# Patient Record
Sex: MALE | Race: WHITE | NOT HISPANIC OR LATINO | URBAN - METROPOLITAN AREA
[De-identification: names, ages, dates, MRNs, and addresses within clinical notes are randomized per-mention and may not be internally consistent; named-entity substitution may affect disease eponyms.]

---

## 2017-07-13 ENCOUNTER — INPATIENT (INPATIENT)
Facility: HOSPITAL | Age: 67
LOS: 4 days | Discharge: HOME | DRG: 131 | End: 2017-07-18
Attending: SURGERY | Admitting: SURGERY
Payer: MEDICARE

## 2017-07-13 VITALS
RESPIRATION RATE: 18 BRPM | TEMPERATURE: 97 F | DIASTOLIC BLOOD PRESSURE: 89 MMHG | OXYGEN SATURATION: 97 % | WEIGHT: 179.9 LBS | HEART RATE: 80 BPM | SYSTOLIC BLOOD PRESSURE: 144 MMHG
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
## 2017-07-13 DIAGNOSIS — S02.19XA OTHER FRACTURE OF BASE OF SKULL, INITIAL ENCOUNTER FOR CLOSED FRACTURE: ICD-10-CM

## 2017-07-13 DIAGNOSIS — I60.9 NONTRAUMATIC SUBARACHNOID HEMORRHAGE, UNSPECIFIED: ICD-10-CM

## 2017-07-13 DIAGNOSIS — S06.5X1A TRAUMATIC SUBDURAL HEMORRHAGE WITH LOSS OF CONSCIOUSNESS OF 30 MINUTES OR LESS, INITIAL ENCOUNTER: ICD-10-CM

## 2017-07-13 DIAGNOSIS — S02.80XA FRACTURE OF OTHER SPECIFIED SKULL AND FACIAL BONES, UNSPECIFIED SIDE, INITIAL ENCOUNTER FOR CLOSED FRACTURE: ICD-10-CM

## 2017-07-13 DIAGNOSIS — H11.31 CONJUNCTIVAL HEMORRHAGE, RIGHT EYE: ICD-10-CM

## 2017-07-13 DIAGNOSIS — S02.401A MAXILLARY FRACTURE, UNSPECIFIED SIDE, INITIAL ENCOUNTER FOR CLOSED FRACTURE: ICD-10-CM

## 2017-07-13 DIAGNOSIS — S06.6X1A TRAUMATIC SUBARACHNOID HEMORRHAGE WITH LOSS OF CONSCIOUSNESS OF 30 MINUTES OR LESS, INITIAL ENCOUNTER: ICD-10-CM

## 2017-07-13 LAB
ALBUMIN SERPL ELPH-MCNC: 4.8 G/DL — SIGNIFICANT CHANGE UP (ref 3.3–5.2)
ALP SERPL-CCNC: 63 U/L — SIGNIFICANT CHANGE UP (ref 40–120)
ALT FLD-CCNC: 25 U/L — SIGNIFICANT CHANGE UP
ANION GAP SERPL CALC-SCNC: 13 MMOL/L — SIGNIFICANT CHANGE UP (ref 5–17)
APTT BLD: 30.5 SEC — SIGNIFICANT CHANGE UP (ref 27.5–37.4)
AST SERPL-CCNC: 44 U/L — HIGH
BASOPHILS # BLD AUTO: 0 K/UL — SIGNIFICANT CHANGE UP (ref 0–0.2)
BASOPHILS NFR BLD AUTO: 0.1 % — SIGNIFICANT CHANGE UP (ref 0–2)
BILIRUB SERPL-MCNC: 0.7 MG/DL — SIGNIFICANT CHANGE UP (ref 0.4–2)
BLD GP AB SCN SERPL QL: SIGNIFICANT CHANGE UP
BUN SERPL-MCNC: 22 MG/DL — HIGH (ref 8–20)
CALCIUM SERPL-MCNC: 9.8 MG/DL — SIGNIFICANT CHANGE UP (ref 8.6–10.2)
CHLORIDE SERPL-SCNC: 101 MMOL/L — SIGNIFICANT CHANGE UP (ref 98–107)
CO2 SERPL-SCNC: 27 MMOL/L — SIGNIFICANT CHANGE UP (ref 22–29)
CREAT SERPL-MCNC: 0.82 MG/DL — SIGNIFICANT CHANGE UP (ref 0.5–1.3)
GLUCOSE SERPL-MCNC: 127 MG/DL — HIGH (ref 70–115)
HCT VFR BLD CALC: 41.3 % — LOW (ref 42–52)
HGB BLD-MCNC: 14 G/DL — SIGNIFICANT CHANGE UP (ref 14–18)
INR BLD: 1.07 RATIO — SIGNIFICANT CHANGE UP (ref 0.88–1.16)
LYMPHOCYTES # BLD AUTO: 1.1 K/UL — SIGNIFICANT CHANGE UP (ref 1–4.8)
LYMPHOCYTES # BLD AUTO: 10.7 % — LOW (ref 20–55)
MCHC RBC-ENTMCNC: 30.1 PG — SIGNIFICANT CHANGE UP (ref 27–31)
MCHC RBC-ENTMCNC: 33.9 G/DL — SIGNIFICANT CHANGE UP (ref 32–36)
MCV RBC AUTO: 88.8 FL — SIGNIFICANT CHANGE UP (ref 80–94)
MONOCYTES # BLD AUTO: 0.8 K/UL — SIGNIFICANT CHANGE UP (ref 0–0.8)
MONOCYTES NFR BLD AUTO: 7.9 % — SIGNIFICANT CHANGE UP (ref 3–10)
NEUTROPHILS # BLD AUTO: 8.5 K/UL — HIGH (ref 1.8–8)
NEUTROPHILS NFR BLD AUTO: 81.1 % — HIGH (ref 37–73)
PLATELET # BLD AUTO: 293 K/UL — SIGNIFICANT CHANGE UP (ref 150–400)
POTASSIUM SERPL-MCNC: 4.7 MMOL/L — SIGNIFICANT CHANGE UP (ref 3.5–5.3)
POTASSIUM SERPL-SCNC: 4.7 MMOL/L — SIGNIFICANT CHANGE UP (ref 3.5–5.3)
PROT SERPL-MCNC: 7.4 G/DL — SIGNIFICANT CHANGE UP (ref 6.6–8.7)
PROTHROM AB SERPL-ACNC: 11.8 SEC — SIGNIFICANT CHANGE UP (ref 9.8–12.7)
RBC # BLD: 4.65 M/UL — SIGNIFICANT CHANGE UP (ref 4.6–6.2)
RBC # FLD: 12.3 % — SIGNIFICANT CHANGE UP (ref 11–15.6)
SODIUM SERPL-SCNC: 141 MMOL/L — SIGNIFICANT CHANGE UP (ref 135–145)
TYPE + AB SCN PNL BLD: SIGNIFICANT CHANGE UP
WBC # BLD: 10.5 K/UL — SIGNIFICANT CHANGE UP (ref 4.8–10.8)
WBC # FLD AUTO: 10.5 K/UL — SIGNIFICANT CHANGE UP (ref 4.8–10.8)

## 2017-07-13 PROCEDURE — 71010: CPT | Mod: 26

## 2017-07-13 PROCEDURE — 99232 SBSQ HOSP IP/OBS MODERATE 35: CPT

## 2017-07-13 PROCEDURE — 93010 ELECTROCARDIOGRAM REPORT: CPT

## 2017-07-13 PROCEDURE — 70450 CT HEAD/BRAIN W/O DYE: CPT | Mod: 26

## 2017-07-13 PROCEDURE — 99291 CRITICAL CARE FIRST HOUR: CPT

## 2017-07-13 PROCEDURE — 72125 CT NECK SPINE W/O DYE: CPT | Mod: 26

## 2017-07-13 PROCEDURE — 70450 CT HEAD/BRAIN W/O DYE: CPT | Mod: 26,77

## 2017-07-13 PROCEDURE — 70486 CT MAXILLOFACIAL W/O DYE: CPT | Mod: 26

## 2017-07-13 RX ORDER — CEFAZOLIN SODIUM 1 G
1000 VIAL (EA) INJECTION ONCE
Qty: 0 | Refills: 0 | Status: COMPLETED | OUTPATIENT
Start: 2017-07-13 | End: 2017-07-13

## 2017-07-13 RX ORDER — ACETAMINOPHEN 500 MG
650 TABLET ORAL EVERY 6 HOURS
Qty: 0 | Refills: 0 | Status: DISCONTINUED | OUTPATIENT
Start: 2017-07-13 | End: 2017-07-17

## 2017-07-13 RX ORDER — ACETAMINOPHEN 500 MG
975 TABLET ORAL ONCE
Qty: 0 | Refills: 0 | Status: COMPLETED | OUTPATIENT
Start: 2017-07-13 | End: 2017-07-13

## 2017-07-13 RX ORDER — SODIUM CHLORIDE 9 MG/ML
1000 INJECTION INTRAMUSCULAR; INTRAVENOUS; SUBCUTANEOUS
Qty: 0 | Refills: 0 | Status: DISCONTINUED | OUTPATIENT
Start: 2017-07-13 | End: 2017-07-14

## 2017-07-13 RX ADMIN — Medication 100 MILLIGRAM(S): at 15:29

## 2017-07-13 RX ADMIN — Medication 650 MILLIGRAM(S): at 23:41

## 2017-07-13 RX ADMIN — Medication 975 MILLIGRAM(S): at 16:00

## 2017-07-13 RX ADMIN — Medication 650 MILLIGRAM(S): at 22:13

## 2017-07-13 RX ADMIN — Medication 975 MILLIGRAM(S): at 14:21

## 2017-07-13 NOTE — ED ADULT NURSE REASSESSMENT NOTE - NS ED NURSE REASSESS COMMENT FT1
pt taken to SICU at this time on cardiac monitor with RN and transporter, pt in no distress, remains AOX3 with family at bedside. even and unlabored resps present, skin warm dry and intact. no change in mental status since arrival to ED, ALLAN with strength and purpose.

## 2017-07-13 NOTE — ED ADULT NURSE NOTE - CAS EDN DISCHARGE ASSESSMENT
Alert and oriented to person, place and time Alert and oriented to person, place and time/Patient baseline mental status/Awake

## 2017-07-13 NOTE — ED STATDOCS - PROGRESS NOTE DETAILS
CT tech advised of urgent need for CT to be completed. I brought to the PT to CT and advised tech he is ready for his scan. Will follow up CT scan. HPI confirmed. PE: HEENT: + right periorbital swelling and eccymosis, + conjunctival hemmorhage to lateral aspect of right eye, + 1.5cm right eyebrow laceration,  no raccoon eyes, no baker sings, no hemotympanum, PERRL, EOMI, no nystagmus, no dental injuries Neck: supple, no midline tenderness to palpation, + FROM, NEXUS negative, no abrasions, no echymosis Chest: non tender, equal expansion bilaterally, no echymosis, no abrasions. Lungs: CTA, good air entry bilaterally, no wheezing, no rales, no rhonchi Abdomen: soft, non tender, no guarding, no rebound, no distention, no ecchymosis Back: no midline tenderness to palpation Extremities: atraumatic, + FROM Skin: no rash Neuro: A & O x 3, clear speech, no focal deficits trauma team and neurosurgical PA at bedside.

## 2017-07-13 NOTE — ED STATDOCS - MEDICAL DECISION MAKING DETAILS
MD note needed gcs 15 fall 8 feeet no neuro deficits with acute SAH SDH trauma consulted multiple bedside reassessments neuro status .

## 2017-07-13 NOTE — CONSULT NOTE ADULT - ASSESSMENT
A/P: 66 y/o male s/p fall found with small acute traumatic SAH high right frontal region, and small acute traumatic SDH in the anterior interhemispheric region. Patient seen in ED and offers no focal neurologic deficits.  - ACS admission  - Repeat CT Head 6 hours from original (~19:45)  - Keppra 500 mg BID for seizure ppx for a total of 7 days (D/C 7/20/17)  - Consider plastics consult given orbital involvement A/P: 68 y/o male s/p fall found with small acute traumatic SAH high right frontal region, and small acute traumatic SDH in the anterior interhemispheric region. Patient seen in ED and offers no focal neurologic deficits.  - Discussed with Dr. Christensen  - ACS admission  - Repeat CT Head 6 hours from original (~19:45)  - Keppra 500 mg BID for seizure ppx for a total of 7 days (D/C 7/20/17)  - Consider plastics consult given orbital involvement A/P: 66 y/o male s/p fall found with small acute HH1 MF1 traumatic SAH high right frontal region, small acute traumatic SDH in the anterior interhemispheric region, and multiple maxillofacial fractures. Patient seen in ED and offers no focal neurologic deficits.  - Discussed with Dr. Christensen  - ACS admission  - Repeat CT Head 6 hours from original (~19:45)  - Keppra 500 mg BID for seizure ppx for a total of 7 days (D/C 7/20/17)  - Consider plastics consult given orbital involvement A/P: 68 y/o male s/p fall found with small acute HH1 MF1 traumatic SAH high right frontal region, small acute traumatic SDH in the anterior interhemispheric region, and multiple maxillofacial fractures. Patient seen in ED and offers no focal neurologic deficits.  - Discussed with Dr. Christensen  - ACS admission  - Neuro checks Q1 hours, HOB 30 degrees, normotensive goals/SBP goal < 150  - Repeat CT Head 6 hours from original (~19:45)  - Keppra 500 mg BID for seizure ppx for a total of 7 days (D/C 7/20/17)  - Consider plastics consult given orbital involvement

## 2017-07-13 NOTE — H&P ADULT - NSHPPHYSICALEXAM_GEN_ALL_CORE
Gen - NAD  Eye - PERRLA, Right lateral 180 degree subconjunctival hemorrhage, no hyphema, EOMI, no entrapment  Head - Periorbital swelling and ecchymosis on right.    ENT: MMM  Neck - trachea midline, no cervical tenderness  Chest- stable, non tender, no resp distress  Abd - soft NT  Neuro - AAOx4.  Normal power and sensation all extremities  Ext: No deformities, no bony tenderness, normal range of motion all joints.

## 2017-07-13 NOTE — ED ADULT NURSE REASSESSMENT NOTE - NS ED NURSE REASSESS COMMENT FT1
Pt remains alert, oriented x 3. headache present, medicated as ordered. ice pack applied to ecchymotic rt eye.

## 2017-07-13 NOTE — ED ADULT NURSE REASSESSMENT NOTE - NS ED NURSE REASSESS COMMENT FT1
pt remains AOX3 with no neurological deficits, ALLAN with strength and purpose, made aware of CT results by ER MD Parker. trauma paged and at bedside for evaulation, ED charge RN aware and pt to be placed into main ED.

## 2017-07-13 NOTE — H&P ADULT - ASSESSMENT
68 y/o M with Mild TBI, SAH, SDH with LOC less than 30 min from fall from height.  All imaging independently reviewed.  Agree with interpretations.

## 2017-07-13 NOTE — ED ADULT NURSE NOTE - OBJECTIVE STATEMENT
pt ambulatory into ED with reports of falling off a ladder one story up. states he fell and passed out after the fall. ecchymosis and swelling noted to right orbit on exam, pt denies use of thinners or any meds, states only took 400mg advil PTA. pt ambulatory with steady gait in , GCS 15, even and unlabored resps, pt states slight pain on inspiration with no SOB noted. skin warm dry and intact, no code trauma activated as per ER MD and protocol

## 2017-07-13 NOTE — ED STATDOCS - OBJECTIVE STATEMENT
66 y/o M pt with no significant PMHx c/o lacerations s/p mechanical fall. Pt states that he fell 10 feet from a latter. No bloodthinners, no medications. Denies headache, LOC, n/v, joint pain, numbness, tingling or any other complaints. NKDA. 68 y/o M pt with no significant PMHx c/o lacerations s/p mechanical fall. Pt states that he fell 10 feet from a latter. He also reports R sided headache.No blood thinners, no medications. Denies LOC, n/v, joint pain, numbness, tingling or any other complaints. NKDA.

## 2017-07-13 NOTE — ED STATDOCS - ATTENDING CONTRIBUTION TO CARE
I, Sameer Parker, performed the initial face to face bedside interview with this patient regarding history of present illness, review of symptoms and relevant past medical, social and family history.  I completed an independent physical examination.  I was the initial provider who evaluated this patient.  The history, relevant review of systems, past medical and surgical history, medical decision making, and physical examination was documented by the scribe in my presence and I attest to the accuracy of the documentation.  I have signed out the follow up of any pending tests (i.e. labs, radiological studies) to the ACP.  I have communicated the patient’s plan of care and disposition with the ACP.

## 2017-07-13 NOTE — CONSULT NOTE ADULT - SUBJECTIVE AND OBJECTIVE BOX
HISTORY OF PRESENT ILLNESS:   68 y/o male no PMH presents to ED after falling off a ladder ~ 10 feet earlier this morning around 08:00. Attests to loss of consciousness for ~1-2 minutes, wife found him on the floor with blood around him from his R eye and his nose. Took 2 tablets of Advil for pain post fall. States he originally had a moderate to severe headache that has almost completely dissipated, currently rating it as 2/10, relieved with Tylenol received in ED, and mild lip pain. Admits to slightly blurry vision. Denies taking anticoagulants/antiplatelets, nausea, vomiting, weakness, paresthesias, gait change, neck pain, back pain, leg pain, abdominal pain, chest pain, SOB, palpitations.    PAST MEDICAL & SURGICAL HISTORY:  Tonsillectomy as a child  Denies any other PMH, follows up with PCP annually      SOCIAL HISTORY:  Tobacco Use: Denies  EtOH use: Denies  Substance: Denies    Allergies No Known Allergies    REVIEW OF SYSTEMS negative except as noted in HPI    MEDICATIONS:  ceFAZolin   IVPB 1000 milliGRAM(s) IV Intermittent once    Vital Signs Last 24 Hrs  T(C): 36.3 (13 Jul 2017 12:34), Max: 36.3 (13 Jul 2017 12:34)  T(F): 97.4 (13 Jul 2017 12:34), Max: 97.4 (13 Jul 2017 12:34)  HR: 74 (13 Jul 2017 14:06) (74 - 80)  BP: 138/85 (13 Jul 2017 14:06) (138/85 - 144/89)  RR: 16 (13 Jul 2017 14:06) (16 - 18)  SpO2: 95% (13 Jul 2017 14:06) (95% - 97%)    PHYSICAL EXAM:  GENERAL: NAD, well-groomed, well-developed. Pleasant  HEAD:  R supraorbital laceration , nasal abrasion, multiple small abrasions periorally and on lips. No active bleeding noted during examination  EYES: R periorbital ecchymosis and edema, R supraorbital abrasion/laceration, R conjunctival hemorrhage laterally. L eye conjunctiva and sclera clear  ENMT: Right side of upper lip mild edema, small abrasions noted on lips. Moist mucous membranes. No otorrhea or rhinorrhea noted.  NECK: Supple. Nontender to palpation  MENTAL STATUS: AAO x3; Awake; Opens eyes spontaneously; Appropriately conversant without aphasia; following commands  CRANIAL NERVES: PERRL; EOMI; visual fields grossly intact; no facial asymmetry; facial sensation grossly intact to light touch b/l;  tongue midline  MOTOR: strength 5/5 B/L upper and lower extremities; no pronator drift  SENSATION: grossly intact to light touch all extremities  CHEST/LUNG: Clear to auscultation bilaterally  HEART: +S1/+S2; Regular rate and rhythm  ABDOMEN: Soft, nontender, nondistended; bowel sounds present all four quadrants  SKIN: Warm, dry; no rashes or lesions other than what was noted on head/eye exam    RADIOLOGY & ADDITIONAL STUDIES:  (07.13.17 @ 13:45)   EXAM:  CT MAXILLOFACIAL                         EXAM:  CT CERVICAL SPINE                         EXAM:  CT BRAIN           Impression: Small acute subdural hematoma is seen in the anterior interhemispheric region.    Subarachnoid hemorrhage is seen involving the high right frontal region.    Fracture is seen involving the anterior and posterior wall of the right frontal sinus with extension into the right frontal calvarium.    Maxillofacial fractures are identified as described above.    Multiple axial sections were performed through the cervical spine.   Coronal and sagittal reconstructions were performed as well.    Mild loss of normal cervical lordosis is seen.    This could be due to positioning or muscle spasm    Nonspecific lucencies are seen throughout the cervical spine region. These findings are nonspecific though the possibly of underlying lytic lesions cannot be entirely excluded. Clinical correlation is recommended. MRI can be done for further evaluation clinically indicated.    Bilateral hypertrophic facet joint changes are seen at multiple levels which are secondary to degenerative changes.    There are no acute fractures or dislocations identified.     Evaluation of the paraspinal soft tissues is limited due to lack of IV contrast though grossly unremarkable. The visualized portions of both lung apices appear clear.    Impression: No fractures or dislocations are seen involving the cervical spine.

## 2017-07-13 NOTE — ED ADULT TRIAGE NOTE - CHIEF COMPLAINT QUOTE
Fell from 10 foot ladder and landed on his head.  Positive LOC.  Ecchymotic right eye.  Took 2 advil an hour ago.  Dr. Parker to triage immediately to evaluate for trauma activation.

## 2017-07-13 NOTE — H&P ADULT - HISTORY OF PRESENT ILLNESS
66 y/o with right facial pain around eye.  Began suddenly several hours ago.  Context is pt fell from top of a ladder while trying to climb onto roof.  Remembers the ladder shifting out from under him but not the fall.  Was ambulatory following event.  Denies N/V.  No CP/SOB/spine pain/weakness of sensory loss.  No visual disturbances.

## 2017-07-13 NOTE — ED STATDOCS - CARE PLAN
Principal Discharge DX:	Subarachnoid bleed  Secondary Diagnosis:	Loss of consciousness  Secondary Diagnosis:	Orbital fracture

## 2017-07-14 DIAGNOSIS — S02.80XA FRACTURE OF OTHER SPECIFIED SKULL AND FACIAL BONES, UNSPECIFIED SIDE, INITIAL ENCOUNTER FOR CLOSED FRACTURE: ICD-10-CM

## 2017-07-14 DIAGNOSIS — S02.92XA UNSPECIFIED FRACTURE OF FACIAL BONES, INITIAL ENCOUNTER FOR CLOSED FRACTURE: ICD-10-CM

## 2017-07-14 LAB
ANION GAP SERPL CALC-SCNC: 12 MMOL/L — SIGNIFICANT CHANGE UP (ref 5–17)
BUN SERPL-MCNC: 19 MG/DL — SIGNIFICANT CHANGE UP (ref 8–20)
CALCIUM SERPL-MCNC: 9 MG/DL — SIGNIFICANT CHANGE UP (ref 8.6–10.2)
CHLORIDE SERPL-SCNC: 104 MMOL/L — SIGNIFICANT CHANGE UP (ref 98–107)
CO2 SERPL-SCNC: 25 MMOL/L — SIGNIFICANT CHANGE UP (ref 22–29)
CREAT SERPL-MCNC: 0.72 MG/DL — SIGNIFICANT CHANGE UP (ref 0.5–1.3)
GLUCOSE SERPL-MCNC: 125 MG/DL — HIGH (ref 70–115)
HCT VFR BLD CALC: 37.9 % — LOW (ref 42–52)
HGB BLD-MCNC: 12.9 G/DL — LOW (ref 14–18)
MAGNESIUM SERPL-MCNC: 1.9 MG/DL — SIGNIFICANT CHANGE UP (ref 1.8–2.6)
MCHC RBC-ENTMCNC: 30.2 PG — SIGNIFICANT CHANGE UP (ref 27–31)
MCHC RBC-ENTMCNC: 34 G/DL — SIGNIFICANT CHANGE UP (ref 32–36)
MCV RBC AUTO: 88.8 FL — SIGNIFICANT CHANGE UP (ref 80–94)
PHOSPHATE SERPL-MCNC: 3.3 MG/DL — SIGNIFICANT CHANGE UP (ref 2.4–4.7)
PLATELET # BLD AUTO: 245 K/UL — SIGNIFICANT CHANGE UP (ref 150–400)
POTASSIUM SERPL-MCNC: 4 MMOL/L — SIGNIFICANT CHANGE UP (ref 3.5–5.3)
POTASSIUM SERPL-SCNC: 4 MMOL/L — SIGNIFICANT CHANGE UP (ref 3.5–5.3)
RBC # BLD: 4.27 M/UL — LOW (ref 4.6–6.2)
RBC # FLD: 12.3 % — SIGNIFICANT CHANGE UP (ref 11–15.6)
SODIUM SERPL-SCNC: 141 MMOL/L — SIGNIFICANT CHANGE UP (ref 135–145)
WBC # BLD: 6.5 K/UL — SIGNIFICANT CHANGE UP (ref 4.8–10.8)
WBC # FLD AUTO: 6.5 K/UL — SIGNIFICANT CHANGE UP (ref 4.8–10.8)

## 2017-07-14 RX ORDER — LEVETIRACETAM 250 MG/1
500 TABLET, FILM COATED ORAL EVERY 12 HOURS
Qty: 0 | Refills: 0 | Status: DISCONTINUED | OUTPATIENT
Start: 2017-07-14 | End: 2017-07-14

## 2017-07-14 RX ORDER — LEVETIRACETAM 250 MG/1
500 TABLET, FILM COATED ORAL EVERY 12 HOURS
Qty: 0 | Refills: 0 | Status: DISCONTINUED | OUTPATIENT
Start: 2017-07-14 | End: 2017-07-17

## 2017-07-14 RX ORDER — DEXAMETHASONE 0.5 MG/5ML
4 ELIXIR ORAL EVERY 8 HOURS
Qty: 0 | Refills: 0 | Status: DISCONTINUED | OUTPATIENT
Start: 2017-07-14 | End: 2017-07-14

## 2017-07-14 RX ORDER — METOCLOPRAMIDE HCL 10 MG
10 TABLET ORAL ONCE
Qty: 0 | Refills: 0 | Status: COMPLETED | OUTPATIENT
Start: 2017-07-14 | End: 2017-07-14

## 2017-07-14 RX ORDER — DEXAMETHASONE 0.5 MG/5ML
10 ELIXIR ORAL EVERY 12 HOURS
Qty: 0 | Refills: 0 | Status: COMPLETED | OUTPATIENT
Start: 2017-07-14 | End: 2017-07-16

## 2017-07-14 RX ORDER — DEXAMETHASONE 0.5 MG/5ML
10 ELIXIR ORAL
Qty: 0 | Refills: 0 | Status: DISCONTINUED | OUTPATIENT
Start: 2017-07-14 | End: 2017-07-14

## 2017-07-14 RX ADMIN — LEVETIRACETAM 420 MILLIGRAM(S): 250 TABLET, FILM COATED ORAL at 18:10

## 2017-07-14 RX ADMIN — Medication 650 MILLIGRAM(S): at 12:28

## 2017-07-14 RX ADMIN — Medication 650 MILLIGRAM(S): at 06:51

## 2017-07-14 RX ADMIN — Medication 650 MILLIGRAM(S): at 18:47

## 2017-07-14 RX ADMIN — Medication 104 MILLIGRAM(S): at 21:58

## 2017-07-14 RX ADMIN — Medication 650 MILLIGRAM(S): at 19:30

## 2017-07-14 RX ADMIN — Medication 650 MILLIGRAM(S): at 06:07

## 2017-07-14 RX ADMIN — Medication 650 MILLIGRAM(S): at 13:28

## 2017-07-14 NOTE — PROGRESS NOTE ADULT - ASSESSMENT
67ym presented after fall pt ct scan of the brain noted to have SAH remains stable. Pos sinus fx pos right lateral orbital fracture -plastics following.  Awaiting final plastic recommendation with reference to surgical intervention.

## 2017-07-15 RX ORDER — ENOXAPARIN SODIUM 100 MG/ML
40 INJECTION SUBCUTANEOUS DAILY
Qty: 0 | Refills: 0 | Status: DISCONTINUED | OUTPATIENT
Start: 2017-07-15 | End: 2017-07-17

## 2017-07-15 RX ADMIN — Medication 650 MILLIGRAM(S): at 11:50

## 2017-07-15 RX ADMIN — Medication 102 MILLIGRAM(S): at 17:56

## 2017-07-15 RX ADMIN — Medication 650 MILLIGRAM(S): at 02:52

## 2017-07-15 RX ADMIN — Medication 650 MILLIGRAM(S): at 12:30

## 2017-07-15 RX ADMIN — Medication 650 MILLIGRAM(S): at 19:10

## 2017-07-15 RX ADMIN — LEVETIRACETAM 420 MILLIGRAM(S): 250 TABLET, FILM COATED ORAL at 17:54

## 2017-07-15 RX ADMIN — Medication 650 MILLIGRAM(S): at 03:59

## 2017-07-15 RX ADMIN — Medication 650 MILLIGRAM(S): at 18:09

## 2017-07-15 RX ADMIN — LEVETIRACETAM 420 MILLIGRAM(S): 250 TABLET, FILM COATED ORAL at 05:56

## 2017-07-15 RX ADMIN — Medication 102 MILLIGRAM(S): at 05:56

## 2017-07-15 RX ADMIN — ENOXAPARIN SODIUM 40 MILLIGRAM(S): 100 INJECTION SUBCUTANEOUS at 17:54

## 2017-07-16 LAB
ANION GAP SERPL CALC-SCNC: 12 MMOL/L — SIGNIFICANT CHANGE UP (ref 5–17)
BLD GP AB SCN SERPL QL: SIGNIFICANT CHANGE UP
BUN SERPL-MCNC: 17 MG/DL — SIGNIFICANT CHANGE UP (ref 8–20)
CALCIUM SERPL-MCNC: 9.7 MG/DL — SIGNIFICANT CHANGE UP (ref 8.6–10.2)
CHLORIDE SERPL-SCNC: 102 MMOL/L — SIGNIFICANT CHANGE UP (ref 98–107)
CO2 SERPL-SCNC: 27 MMOL/L — SIGNIFICANT CHANGE UP (ref 22–29)
CREAT SERPL-MCNC: 0.66 MG/DL — SIGNIFICANT CHANGE UP (ref 0.5–1.3)
GLUCOSE SERPL-MCNC: 169 MG/DL — HIGH (ref 70–115)
HCT VFR BLD CALC: 37.2 % — LOW (ref 42–52)
HGB BLD-MCNC: 12.7 G/DL — LOW (ref 14–18)
LYMPHOCYTES # BLD AUTO: 0.9 K/UL — LOW (ref 1–4.8)
LYMPHOCYTES # BLD AUTO: 12.9 % — LOW (ref 20–55)
MAGNESIUM SERPL-MCNC: 1.9 MG/DL — SIGNIFICANT CHANGE UP (ref 1.6–2.6)
MCHC RBC-ENTMCNC: 30.2 PG — SIGNIFICANT CHANGE UP (ref 27–31)
MCHC RBC-ENTMCNC: 34.1 G/DL — SIGNIFICANT CHANGE UP (ref 32–36)
MCV RBC AUTO: 88.4 FL — SIGNIFICANT CHANGE UP (ref 80–94)
MONOCYTES # BLD AUTO: 0.3 K/UL — SIGNIFICANT CHANGE UP (ref 0–0.8)
MONOCYTES NFR BLD AUTO: 4.2 % — SIGNIFICANT CHANGE UP (ref 3–10)
NEUTROPHILS # BLD AUTO: 5.7 K/UL — SIGNIFICANT CHANGE UP (ref 1.8–8)
NEUTROPHILS NFR BLD AUTO: 82.8 % — HIGH (ref 37–73)
PHOSPHATE SERPL-MCNC: 3.4 MG/DL — SIGNIFICANT CHANGE UP (ref 2.4–4.7)
PLATELET # BLD AUTO: 236 K/UL — SIGNIFICANT CHANGE UP (ref 150–400)
POTASSIUM SERPL-MCNC: 4 MMOL/L — SIGNIFICANT CHANGE UP (ref 3.5–5.3)
POTASSIUM SERPL-SCNC: 4 MMOL/L — SIGNIFICANT CHANGE UP (ref 3.5–5.3)
RBC # BLD: 4.21 M/UL — LOW (ref 4.6–6.2)
RBC # FLD: 12.1 % — SIGNIFICANT CHANGE UP (ref 11–15.6)
SODIUM SERPL-SCNC: 141 MMOL/L — SIGNIFICANT CHANGE UP (ref 135–145)
TYPE + AB SCN PNL BLD: SIGNIFICANT CHANGE UP
WBC # BLD: 6.9 K/UL — SIGNIFICANT CHANGE UP (ref 4.8–10.8)
WBC # FLD AUTO: 6.9 K/UL — SIGNIFICANT CHANGE UP (ref 4.8–10.8)

## 2017-07-16 PROCEDURE — 99231 SBSQ HOSP IP/OBS SF/LOW 25: CPT

## 2017-07-16 PROCEDURE — 99233 SBSQ HOSP IP/OBS HIGH 50: CPT

## 2017-07-16 RX ORDER — MAGNESIUM OXIDE 400 MG ORAL TABLET 241.3 MG
400 TABLET ORAL ONCE
Qty: 0 | Refills: 0 | Status: COMPLETED | OUTPATIENT
Start: 2017-07-16 | End: 2017-07-16

## 2017-07-16 RX ADMIN — Medication 102 MILLIGRAM(S): at 17:55

## 2017-07-16 RX ADMIN — ENOXAPARIN SODIUM 40 MILLIGRAM(S): 100 INJECTION SUBCUTANEOUS at 12:09

## 2017-07-16 RX ADMIN — Medication 650 MILLIGRAM(S): at 21:44

## 2017-07-16 RX ADMIN — MAGNESIUM OXIDE 400 MG ORAL TABLET 400 MILLIGRAM(S): 241.3 TABLET ORAL at 12:08

## 2017-07-16 RX ADMIN — Medication 650 MILLIGRAM(S): at 12:08

## 2017-07-16 RX ADMIN — Medication 650 MILLIGRAM(S): at 05:46

## 2017-07-16 RX ADMIN — Medication 102 MILLIGRAM(S): at 06:04

## 2017-07-16 RX ADMIN — LEVETIRACETAM 420 MILLIGRAM(S): 250 TABLET, FILM COATED ORAL at 06:04

## 2017-07-16 RX ADMIN — Medication 650 MILLIGRAM(S): at 20:44

## 2017-07-16 RX ADMIN — LEVETIRACETAM 420 MILLIGRAM(S): 250 TABLET, FILM COATED ORAL at 17:55

## 2017-07-16 RX ADMIN — Medication 650 MILLIGRAM(S): at 06:56

## 2017-07-16 RX ADMIN — Medication 650 MILLIGRAM(S): at 13:00

## 2017-07-16 NOTE — PROGRESS NOTE ADULT - ASSESSMENT
ASSESSMENT/PLAN:  67y Male with SAH, SDH, right orbital fracture  Neuro: Continue keppra for a total of 7 days, continue neuro checks  CV: No acute issues  Pulm: Encourage incentive spirometry, OOB as tolerated   GI/Nutrition: Continue regular diet  Plan for sx tomorrow, awaiting cardiac clearance   Plan discussed with attending   /Renal: Voiding    ID: No issues    Endo: Decadron as per plastic surgery    DVT Prophylaxis: SCDs only

## 2017-07-16 NOTE — PROGRESS NOTE ADULT - ATTENDING COMMENTS
NSGY Attg:    see above  headache controlled w Tylenol  no new headache, nausea, emesis, or focal deficit  no CSF otorrhea/rhinorrhea  patient denies salty taste in throat    A/P:  neuro stable  no absolute neurosurgical contraindication to anticipated plastics intervention  patient can f/u w me as outpatient in 2 weeks
injuries as described. 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 He is scheduled to undergo orbital ORIF. He is very fit with 6-10 mets and no major risk factors and having low-risk procedure so based upon clinical criteria would not need pre-operative "optimization"....a 12 lead EKG will be ordered if not already done.
ANGELINE Attg:    see above  see my addendum on initial consultation

## 2017-07-16 NOTE — PROGRESS NOTE ADULT - ASSESSMENT
A/P: 68 y/o male s/p fall admitted with small acute HH1 MF1 traumatic SAH high right frontal region, small acute traumatic SDH in the anterior interhemispheric region, and multiple maxillofacial fractures, post bleed day #3. Patient is doing well and offers no evidence of focal neurologic deficit. Scheduled for orbital fracture repair tomorrow with plastic surgery  - Discussed with Dr. Christensen  - No absolute neurosurgical contraindication to undergoing surgery with plastics tomorrow  - Continue Keppra x total 7 days (d/c 7/20/17)  - No acute neurosurgical intervention warranted at this time.  - Continue primary care team management

## 2017-07-16 NOTE — PHYSICAL THERAPY INITIAL EVALUATION ADULT - ADDITIONAL COMMENTS
The patient lives at home, previously fully I with ADLs, bike riding 200 miles/wk competitively. The patient did not use AD PTA

## 2017-07-16 NOTE — PHYSICAL THERAPY INITIAL EVALUATION ADULT - PERTINENT HX OF CURRENT PROBLEM, REHAB EVAL
The patient reports slipping off a ladder and being found unconcious covered in blood. The patient was found to have an acute SAH, not evolving on repeat head CT. The patient also sustained R orbital fractures awaiting plastic surgery.

## 2017-07-17 DIAGNOSIS — Z29.9 ENCOUNTER FOR PROPHYLACTIC MEASURES, UNSPECIFIED: ICD-10-CM

## 2017-07-17 DIAGNOSIS — Z01.810 ENCOUNTER FOR PREPROCEDURAL CARDIOVASCULAR EXAMINATION: ICD-10-CM

## 2017-07-17 DIAGNOSIS — S02.80XD FRACTURE OF OTHER SPECIFIED SKULL AND FACIAL BONES, UNSPECIFIED SIDE, SUBSEQUENT ENCOUNTER FOR FRACTURE WITH ROUTINE HEALING: ICD-10-CM

## 2017-07-17 LAB
ANION GAP SERPL CALC-SCNC: 12 MMOL/L — SIGNIFICANT CHANGE UP (ref 5–17)
BUN SERPL-MCNC: 20 MG/DL — SIGNIFICANT CHANGE UP (ref 8–20)
CALCIUM SERPL-MCNC: 9.7 MG/DL — SIGNIFICANT CHANGE UP (ref 8.6–10.2)
CHLORIDE SERPL-SCNC: 100 MMOL/L — SIGNIFICANT CHANGE UP (ref 98–107)
CO2 SERPL-SCNC: 27 MMOL/L — SIGNIFICANT CHANGE UP (ref 22–29)
CREAT SERPL-MCNC: 0.67 MG/DL — SIGNIFICANT CHANGE UP (ref 0.5–1.3)
GLUCOSE SERPL-MCNC: 169 MG/DL — HIGH (ref 70–115)
HCT VFR BLD CALC: 36.6 % — LOW (ref 42–52)
HGB BLD-MCNC: 12.4 G/DL — LOW (ref 14–18)
LYMPHOCYTES # BLD AUTO: 1.2 K/UL — SIGNIFICANT CHANGE UP (ref 1–4.8)
LYMPHOCYTES # BLD AUTO: 11.2 % — LOW (ref 20–55)
MAGNESIUM SERPL-MCNC: 2.1 MG/DL — SIGNIFICANT CHANGE UP (ref 1.6–2.6)
MCHC RBC-ENTMCNC: 30.2 PG — SIGNIFICANT CHANGE UP (ref 27–31)
MCHC RBC-ENTMCNC: 33.9 G/DL — SIGNIFICANT CHANGE UP (ref 32–36)
MCV RBC AUTO: 89.1 FL — SIGNIFICANT CHANGE UP (ref 80–94)
MONOCYTES # BLD AUTO: 0.6 K/UL — SIGNIFICANT CHANGE UP (ref 0–0.8)
MONOCYTES NFR BLD AUTO: 5.5 % — SIGNIFICANT CHANGE UP (ref 3–10)
NEUTROPHILS # BLD AUTO: 8.5 K/UL — HIGH (ref 1.8–8)
NEUTROPHILS NFR BLD AUTO: 83 % — HIGH (ref 37–73)
PHOSPHATE SERPL-MCNC: 3.3 MG/DL — SIGNIFICANT CHANGE UP (ref 2.4–4.7)
PLATELET # BLD AUTO: 261 K/UL — SIGNIFICANT CHANGE UP (ref 150–400)
POTASSIUM SERPL-MCNC: 4 MMOL/L — SIGNIFICANT CHANGE UP (ref 3.5–5.3)
POTASSIUM SERPL-SCNC: 4 MMOL/L — SIGNIFICANT CHANGE UP (ref 3.5–5.3)
RBC # BLD: 4.11 M/UL — LOW (ref 4.6–6.2)
RBC # FLD: 12.2 % — SIGNIFICANT CHANGE UP (ref 11–15.6)
SODIUM SERPL-SCNC: 139 MMOL/L — SIGNIFICANT CHANGE UP (ref 135–145)
WBC # BLD: 10.2 K/UL — SIGNIFICANT CHANGE UP (ref 4.8–10.8)
WBC # FLD AUTO: 10.2 K/UL — SIGNIFICANT CHANGE UP (ref 4.8–10.8)

## 2017-07-17 PROCEDURE — 99223 1ST HOSP IP/OBS HIGH 75: CPT

## 2017-07-17 RX ORDER — CEFAZOLIN SODIUM 1 G
2000 VIAL (EA) INJECTION ONCE
Qty: 0 | Refills: 0 | Status: COMPLETED | OUTPATIENT
Start: 2017-07-17 | End: 2017-07-17

## 2017-07-17 RX ORDER — IBUPROFEN 200 MG
400 TABLET ORAL EVERY 4 HOURS
Qty: 0 | Refills: 0 | Status: DISCONTINUED | OUTPATIENT
Start: 2017-07-17 | End: 2017-07-18

## 2017-07-17 RX ORDER — FENTANYL CITRATE 50 UG/ML
50 INJECTION INTRAVENOUS
Qty: 0 | Refills: 0 | Status: DISCONTINUED | OUTPATIENT
Start: 2017-07-17 | End: 2017-07-17

## 2017-07-17 RX ORDER — ENOXAPARIN SODIUM 100 MG/ML
40 INJECTION SUBCUTANEOUS DAILY
Qty: 0 | Refills: 0 | Status: DISCONTINUED | OUTPATIENT
Start: 2017-07-17 | End: 2017-07-17

## 2017-07-17 RX ORDER — ENOXAPARIN SODIUM 100 MG/ML
40 INJECTION SUBCUTANEOUS DAILY
Qty: 0 | Refills: 0 | Status: DISCONTINUED | OUTPATIENT
Start: 2017-07-17 | End: 2017-07-18

## 2017-07-17 RX ORDER — ACETAMINOPHEN 500 MG
650 TABLET ORAL EVERY 6 HOURS
Qty: 0 | Refills: 0 | Status: DISCONTINUED | OUTPATIENT
Start: 2017-07-17 | End: 2017-07-18

## 2017-07-17 RX ORDER — LEVETIRACETAM 250 MG/1
500 TABLET, FILM COATED ORAL
Qty: 0 | Refills: 0 | Status: DISCONTINUED | OUTPATIENT
Start: 2017-07-17 | End: 2017-07-18

## 2017-07-17 RX ORDER — LEVETIRACETAM 250 MG/1
500 TABLET, FILM COATED ORAL EVERY 12 HOURS
Qty: 0 | Refills: 0 | Status: DISCONTINUED | OUTPATIENT
Start: 2017-07-17 | End: 2017-07-17

## 2017-07-17 RX ORDER — FENTANYL CITRATE 50 UG/ML
25 INJECTION INTRAVENOUS
Qty: 0 | Refills: 0 | Status: DISCONTINUED | OUTPATIENT
Start: 2017-07-17 | End: 2017-07-17

## 2017-07-17 RX ORDER — BACITRACIN 500 [USP'U]/G
1 OINTMENT OPHTHALMIC
Qty: 0 | Refills: 0 | Status: DISCONTINUED | OUTPATIENT
Start: 2017-07-17 | End: 2017-07-18

## 2017-07-17 RX ORDER — CHLORHEXIDINE GLUCONATE 213 G/1000ML
15 SOLUTION TOPICAL THREE TIMES A DAY
Qty: 0 | Refills: 0 | Status: DISCONTINUED | OUTPATIENT
Start: 2017-07-17 | End: 2017-07-18

## 2017-07-17 RX ORDER — SODIUM CHLORIDE 9 MG/ML
1000 INJECTION, SOLUTION INTRAVENOUS
Qty: 0 | Refills: 0 | Status: DISCONTINUED | OUTPATIENT
Start: 2017-07-17 | End: 2017-07-17

## 2017-07-17 RX ORDER — ONDANSETRON 8 MG/1
4 TABLET, FILM COATED ORAL ONCE
Qty: 0 | Refills: 0 | Status: DISCONTINUED | OUTPATIENT
Start: 2017-07-17 | End: 2017-07-17

## 2017-07-17 RX ADMIN — Medication 24 MILLIGRAM(S): at 19:26

## 2017-07-17 RX ADMIN — LEVETIRACETAM 420 MILLIGRAM(S): 250 TABLET, FILM COATED ORAL at 06:31

## 2017-07-17 RX ADMIN — Medication 100 MILLIGRAM(S): at 18:49

## 2017-07-17 RX ADMIN — Medication 400 MILLIGRAM(S): at 19:11

## 2017-07-17 RX ADMIN — Medication 650 MILLIGRAM(S): at 21:17

## 2017-07-17 RX ADMIN — CHLORHEXIDINE GLUCONATE 15 MILLILITER(S): 213 SOLUTION TOPICAL at 21:48

## 2017-07-17 RX ADMIN — Medication 650 MILLIGRAM(S): at 22:15

## 2017-07-17 RX ADMIN — LEVETIRACETAM 500 MILLIGRAM(S): 250 TABLET, FILM COATED ORAL at 19:25

## 2017-07-17 NOTE — BRIEF OPERATIVE NOTE - PROCEDURE
ORIF, fracture, maxilla  07/17/2017  ORIF of right ZMC fracture, closed reduction of nasal fracture  Active  RRUOTOLO

## 2017-07-17 NOTE — OCCUPATIONAL THERAPY INITIAL EVALUATION ADULT - PERSONAL SAFETY AND JUDGMENT, REHAB EVAL
His vision was initially blurry with decreased peripheral vision.  His full vision has since returned, and is not sensitive to light, text on phone, TV screen./intact

## 2017-07-17 NOTE — OCCUPATIONAL THERAPY INITIAL EVALUATION ADULT - ADDITIONAL COMMENTS
Pt lives in private home with spouse.  He rides his bike competitively - 200 miles/week.  He has no steps to enter the home, but a full flight to bedroom.  He has a tub with curtain, no seat, no grab bar.  He drives.  They plan to move next door within next month or so to one floor living, no steps to enter, with step in shower.

## 2017-07-17 NOTE — CONSULT NOTE ADULT - ASSESSMENT
67 year old male with traumatic head injury with right orbital fracture with sub arachnoid hemorrhage for surgery tday

## 2017-07-17 NOTE — CONSULT NOTE ADULT - ATTENDING COMMENTS
NSGY Attg:    Patient seen and examined by me this morning.  No headaches, nausea or vomiting.  Patient denies fluid from nose or salty taste in back of throat.    Exam:  A and O x 3  R orbit swollen  FS   TML  no CSF otorhea/rhinorhea  ALLAN to command  5/5 bilaterally    repeat CT -- stable ICH    A/P:  cont supportive care per ICU  keppra x 7 days today  no acute NSGY intervention
Thank you for allowing me to participate in care of your patient.   Please call as needed.

## 2017-07-17 NOTE — CONSULT NOTE ADULT - SUBJECTIVE AND OBJECTIVE BOX
[facility]                                                        Office: [address] [address]                                                       Telephone: [phone]. Fax:[phone]                                                              CARDIOLOGY CONSULTATION NOTE                                                                                             Consult requested by:  Dr. Sumit Schneider MD (Trauma Surgery)     Reason for Consultation: pre-operative cardiovascular risk evaluation and management. History obtained by: Patient and medical record     obtained: No    Chief complaint:    Patient is a 67y old  Male who presents with a chief complaint of Face/eye pain (13 Jul 2017 15:42)      HPI:  68 y/o with right facial pain around eye.  Began suddenly several hours ago.  Context is pt fell from top of a ladder while trying to climb onto roof.  Remembers the ladder shifting out from under him but not the fall.  Was ambulatory following event.  Denies N/V.  No CP/SOB/spine pain/weakness of sensory loss.  No visual disturbances. (13 Jul 2017 15:42)  Found to have orbital fracture and Subarachnoid hemorrhage.  now plan for right orbital fracture plastic surgery.   Patient is very active. No cardiac symptoms. bikes 28 miles in a week.         REVIEW OF SYMPTOMS: Cardiovascular:  See HPI. No chest pain,  No dyspnea,  No syncope,  No palpitations, No dizziness, No Orthopnea,      No Paroxsymal nocturnal dyspnea;  Respiratory:  No Dyspnea, No cough,     Genitourinary:  No dysuria, no hematuria; Gastrointestinal:  No nausea, no vomiting. No diarrhea.  No abdominal pain. No dark color stool, no melena ; Neurological:+  headache, no dizziness, no slurred speech;  Psychiatric: No agitation, no anxiety.  ALL OTHER REVIEW OF SYSTEMS ARE NEGATIVE.    ALLERGIES:   No Known Allergies    Intolerances-NONE           CURRENT MEDICATIONS:     levETIRAcetam  IVPB  enoxaparin Injectable      HOME MEDICATIONS:  NONE     PAST MEDICAL HISTORY  not contributory     PAST SURGICAL HISTORY  DNS and Tonsils during adulthood.    FAMILY HISTORY:  No pertinent family history in first degree relatives  Father : MI.    SOCIAL HISTORY:  Denies smoking/alcohol/drugs      Vital Signs Last 24 Hrs  T(C): 36.7 (17 Jul 2017 08:32), Max: 36.9 (17 Jul 2017 04:48)  T(F): 98 (17 Jul 2017 08:32), Max: 98.5 (17 Jul 2017 04:48)  HR: 58 (17 Jul 2017 08:32) (57 - 78)  BP: 135/76 (17 Jul 2017 08:32) (113/53 - 141/81)  BP(mean): --  RR: 18 (17 Jul 2017 08:32) (18 - 18)  SpO2: 95% (17 Jul 2017 08:32) (94% - 95%)      PHYSICAL EXAM:  Constitutional: Comfortable . No acute distress.   HEENT: Traumatic and normcephalic , neck is supple . no JVD. No carotid bruit. PEERL   Right orbit abrasion and tenderness to touch.   CNS: A&Ox3. No focal deficits. EOMI. Cranial nerves II-IX are intact.  Right eye: conjunctival and scleral hemorrhage.   Lymph Nodes: Cervical : Not palpable.  Respiratory: CTAB  Cardiovascular: S1S2 RRR. No murmur/rubs or gallop.  Gastrointestinal: Soft non-tender and non distended . +Bowel sounds. negative Bradley's sign.  Extremities: No edema.   Psychiatric: Calm . no agitation.  Skin: No skin rash/ulcers visualized to face, hands or feet.    Intake and output:   07-16 @ 07:01  -  07-17 @ 07:00  --------------------------------------------------------  IN: 250 mL / OUT: 0 mL / NET: 250 mL        LABS:                        12.7   6.9   )-----------( 236      ( 16 Jul 2017 06:19 )             37.2     07-17    139  |  100  |  20.0  ----------------------------<  169<H>  4.0   |  27.0  |  0.67    Ca    9.7      17 Jul 2017 06:45  Phos  3.3     07-17  Mg     2.1     07-17        ;p-BNP=        INTERPRETATION OF TELEMETRY: Reviewed by me.   ECG: Reviewed by me. Normal ECG     RADIOLOGY & ADDITIONAL STUDIES:    X-ray:  reviewed by me. unremarkable   CT scan: < from: CT Head No Cont (07.13.17 @ 20:22) >  Impression: Frontal bone fractures. Right orbital fracture. Subarachnoid   hemorrhage in frontal region unchanged.    No change in the appearance of the brain since the prior exam    < end of copied text >    ECHO FINDINGS: Date:  not available               : LVEF=          ; RV function:       ; Valvular abnormalities: No significant valvular abnormality.  Mitral valve:           ;  Aortic valve:              ;Tricuspid valve:         ; Pulmonary pressures:        mm Hg. Pericardium:

## 2017-07-17 NOTE — PROGRESS NOTE ADULT - PROBLEM SELECTOR PLAN 2
Neuro: Continue keppra for a total of 7 days, continue neuro checks  CV: No acute issues  Pulm: Encourage incentive spirometry, OOB as tolerated   GI/Nutrition: Continue regular diet  Plan for sx tomorrow, awaiting cardiac clearance   Plan discussed with attending   /Renal: Voiding  ID: No issues  Endo: Decadron as per plastic surgery  DVT Prophylaxis: SCDs , lovenox.

## 2017-07-17 NOTE — CONSULT NOTE ADULT - PROBLEM SELECTOR RECOMMENDATION 9
pre-operative cardiovascular risk evaluation and management. No cardiac symptoms. METS > 4. Normal ECG.   RCRI (revised cardiac risk index score) < 1%. Beltran perioperative cardiac risk score <1%. NSQIP score < 1%. No further cardiac work up is needed. Proceed for surgery as indicated.

## 2017-07-17 NOTE — BRIEF OPERATIVE NOTE - PRE-OP DX
Zygomatic fracture, right side, initial encounter for open fracture  07/17/2017  Right ZMC fracture with enopthalmos. Nasal fracture  Active  Elodia Kapadia A

## 2017-07-18 ENCOUNTER — TRANSCRIPTION ENCOUNTER (OUTPATIENT)
Age: 67
End: 2017-07-18

## 2017-07-18 VITALS
SYSTOLIC BLOOD PRESSURE: 113 MMHG | DIASTOLIC BLOOD PRESSURE: 62 MMHG | TEMPERATURE: 98 F | RESPIRATION RATE: 20 BRPM | HEART RATE: 106 BPM | OXYGEN SATURATION: 97 %

## 2017-07-18 PROCEDURE — 99285 EMERGENCY DEPT VISIT HI MDM: CPT | Mod: 25

## 2017-07-18 PROCEDURE — 83735 ASSAY OF MAGNESIUM: CPT

## 2017-07-18 PROCEDURE — 36415 COLL VENOUS BLD VENIPUNCTURE: CPT

## 2017-07-18 PROCEDURE — 80053 COMPREHEN METABOLIC PANEL: CPT

## 2017-07-18 PROCEDURE — 86900 BLOOD TYPING SEROLOGIC ABO: CPT

## 2017-07-18 PROCEDURE — 70450 CT HEAD/BRAIN W/O DYE: CPT

## 2017-07-18 PROCEDURE — 71045 X-RAY EXAM CHEST 1 VIEW: CPT

## 2017-07-18 PROCEDURE — 97161 PT EVAL LOW COMPLEX 20 MIN: CPT

## 2017-07-18 PROCEDURE — 85610 PROTHROMBIN TIME: CPT

## 2017-07-18 PROCEDURE — 70486 CT MAXILLOFACIAL W/O DYE: CPT

## 2017-07-18 PROCEDURE — 84100 ASSAY OF PHOSPHORUS: CPT

## 2017-07-18 PROCEDURE — 85027 COMPLETE CBC AUTOMATED: CPT

## 2017-07-18 PROCEDURE — 85730 THROMBOPLASTIN TIME PARTIAL: CPT

## 2017-07-18 PROCEDURE — 97167 OT EVAL HIGH COMPLEX 60 MIN: CPT

## 2017-07-18 PROCEDURE — 86850 RBC ANTIBODY SCREEN: CPT

## 2017-07-18 PROCEDURE — 72125 CT NECK SPINE W/O DYE: CPT

## 2017-07-18 PROCEDURE — 80048 BASIC METABOLIC PNL TOTAL CA: CPT

## 2017-07-18 PROCEDURE — 86901 BLOOD TYPING SEROLOGIC RH(D): CPT

## 2017-07-18 PROCEDURE — C1713: CPT

## 2017-07-18 PROCEDURE — 93005 ELECTROCARDIOGRAM TRACING: CPT

## 2017-07-18 RX ORDER — CHLORHEXIDINE GLUCONATE 213 G/1000ML
15 SOLUTION TOPICAL
Qty: 1 | Refills: 0 | OUTPATIENT
Start: 2017-07-18 | End: 2017-07-25

## 2017-07-18 RX ORDER — CEPHALEXIN 500 MG
1 CAPSULE ORAL
Qty: 28 | Refills: 0 | OUTPATIENT
Start: 2017-07-18 | End: 2017-07-25

## 2017-07-18 RX ORDER — LEVETIRACETAM 250 MG/1
1 TABLET, FILM COATED ORAL
Qty: 6 | Refills: 0 | OUTPATIENT
Start: 2017-07-18 | End: 2017-07-21

## 2017-07-18 RX ORDER — BACITRACIN 500 [USP'U]/G
1 OINTMENT OPHTHALMIC
Qty: 1 | Refills: 0 | OUTPATIENT
Start: 2017-07-18 | End: 2017-07-25

## 2017-07-18 RX ORDER — CEPHALEXIN 500 MG
250 CAPSULE ORAL EVERY 6 HOURS
Qty: 0 | Refills: 0 | Status: DISCONTINUED | OUTPATIENT
Start: 2017-07-18 | End: 2017-07-18

## 2017-07-18 RX ADMIN — Medication 650 MILLIGRAM(S): at 12:52

## 2017-07-18 RX ADMIN — LEVETIRACETAM 500 MILLIGRAM(S): 250 TABLET, FILM COATED ORAL at 06:32

## 2017-07-18 RX ADMIN — Medication 4 MILLIGRAM(S): at 06:31

## 2017-07-18 RX ADMIN — ENOXAPARIN SODIUM 40 MILLIGRAM(S): 100 INJECTION SUBCUTANEOUS at 12:51

## 2017-07-18 RX ADMIN — CHLORHEXIDINE GLUCONATE 15 MILLILITER(S): 213 SOLUTION TOPICAL at 06:31

## 2017-07-18 RX ADMIN — Medication 650 MILLIGRAM(S): at 06:34

## 2017-07-18 RX ADMIN — BACITRACIN 1 APPLICATION(S): 500 OINTMENT OPHTHALMIC at 06:33

## 2017-07-18 RX ADMIN — Medication 250 MILLIGRAM(S): at 12:51

## 2017-07-18 NOTE — DISCHARGE NOTE ADULT - CARE PROVIDER_API CALL
Elodia Kapadia), Plastic Surgery  [address] [place] [address]  Phone: [phone]  Fax: [phone]

## 2017-07-18 NOTE — PROGRESS NOTE ADULT - SUBJECTIVE AND OBJECTIVE BOX
INTERVAL HPI/OVERNIGHT EVENTS:  No new problems  PRESSORS: [ ] YES [ ] NO  WHICH:  DOSE:    ANTIBIOTICS:                  DATE STARTED:  ANTIBIOTICS:                  DATE STARTED:  ANTIBIOTICS:                  DATE STARTED:    MEDICATIONS  (STANDING):  sodium chloride 0.9%. 1000 milliLiter(s) (100 mL/Hr) IV Continuous <Continuous>  levETIRAcetam  IVPB 500 milliGRAM(s) IV Intermittent every 12 hours    MEDICATIONS  (PRN):  acetaminophen   Tablet. 650 milliGRAM(s) Oral every 6 hours PRN Mild Pain (1 - 3)      Drug Dosing Weight  Height (cm): 175.26 (13 Jul 2017 16:00)  Weight (kg): 81.2 (13 Jul 2017 16:00)  BMI (kg/m2): 26.4 (13 Jul 2017 16:00)  BSA (m2): 1.97 (13 Jul 2017 16:00)    CENTRAL LINE: [ ] YES [ ] NO  LOCATION:   DATE INSERTED:  REMOVE: [ ] YES [ ] NO  EXPLAIN:    WADE: [ ] YES [ ] NO    DATE INSERTED:  REMOVE: [ ] YES [ ] NO  EXPLAIN:    A-LINE: [ ] YES [ ] NO  LOCATION:   DATE INSERTED:  REMOVE: [ ] YES [ ] NO  EXPLAIN:    PAST MEDICAL & SURGICAL HISTORY:      ICU Vital Signs Last 24 Hrs  T(C): 36.6 (14 Jul 2017 08:00), Max: 36.8 (13 Jul 2017 16:00)  T(F): 97.9 (14 Jul 2017 08:00), Max: 98.3 (13 Jul 2017 16:00)  HR: 79 (14 Jul 2017 11:00) (67 - 90)  BP: 136/76 (14 Jul 2017 11:00) (114/56 - 168/81)  BP(mean): 100 (14 Jul 2017 11:00) (78 - 115)  ABP: --  ABP(mean): --  RR: 23 (14 Jul 2017 11:00) (16 - 32)  SpO2: 96% (14 Jul 2017 11:00) (93% - 98%)          I&O's Detail    13 Jul 2017 07:01  -  14 Jul 2017 07:00  --------------------------------------------------------  IN:    Oral Fluid: 240 mL    sodium chloride 0.9%.: 1600 mL  Total IN: 1840 mL    OUT:    Voided: 1650 mL  Total OUT: 1650 mL    Total NET: 190 mL      14 Jul 2017 07:01  -  14 Jul 2017 12:11  --------------------------------------------------------  IN:    sodium chloride 0.9%.: 400 mL  Total IN: 400 mL    OUT:    Voided: 400 mL  Total OUT: 400 mL    Total NET: 0 mL          LABS:  CBC Full  -  ( 14 Jul 2017 04:35 )  WBC Count : 6.5 K/uL  Hemoglobin : 12.9 g/dL  Hematocrit : 37.9 %  Platelet Count - Automated : 245 K/uL  Mean Cell Volume : 88.8 fl  Mean Cell Hemoglobin : 30.2 pg  Mean Cell Hemoglobin Concentration : 34.0 g/dL  Auto Neutrophil # : x  Auto Lymphocyte # : x  Auto Monocyte # : x  Auto Eosinophil # : x  Auto Basophil # : x  Auto Neutrophil % : x  Auto Lymphocyte % : x  Auto Monocyte % : x  Auto Eosinophil % : x  Auto Basophil % : x    07-14    141  |  104  |  19.0  ----------------------------<  125<H>  4.0   |  25.0  |  0.72    Ca    9.0      14 Jul 2017 04:35  Phos  3.3     07-14  Mg     1.9     07-14    TPro  7.4  /  Alb  4.8  /  TBili  0.7  /  DBili  x   /  AST  44<H>  /  ALT  25  /  AlkPhos  63  07-13    PT/INR - ( 13 Jul 2017 14:45 )   PT: 11.8 sec;   INR: 1.07 ratio         PTT - ( 13 Jul 2017 14:45 )  PTT:30.5 sec      Assessment and Plan:    Neuro: GCS 15. Monitor for delirium.  Continue to optimize pain control. Serial Neurologic assessments.  Keppra x7 days    HEENT: no issues    CV: Continue hemodynamic monitoring    Pulm: Pulmonary toilet.  Continue incentive spirometer.  Chest PT.  Encourage OOB to chair and ambulation     GI/Nutrition: Start diet  Bowel Regimen    /Renal: monitor UOP. Monitor BMP.  Replete Lytes as needed      HEME- DVT prophylaxis, SCDs    ID: No active issues
INTERVAL HPI/OVERNIGHT EVENTS/SUBJECTIVE:  Pt offers no new complaints    ICU Vital Signs Last 24 Hrs  T(C): 36.6 (15 Jul 2017 00:00), Max: 36.8 (14 Jul 2017 12:00)  T(F): 97.8 (15 Jul 2017 00:00), Max: 98.3 (14 Jul 2017 12:00)  HR: 63 (15 Jul 2017 00:00) (63 - 84)  BP: 133/63 (15 Jul 2017 00:00) (104/64 - 156/77)  BP(mean): 90 (15 Jul 2017 00:00) (69 - 110)  ABP: --  ABP(mean): --  RR: 19 (15 Jul 2017 00:00) (12 - 30)  SpO2: 94% (15 Jul 2017 00:00) (92% - 96%)      I&O's Detail    13 Jul 2017 07:01  -  14 Jul 2017 07:00  --------------------------------------------------------  IN:    Oral Fluid: 240 mL    sodium chloride 0.9%: 1600 mL  Total IN: 1840 mL    OUT:    Voided: 1650 mL  Total OUT: 1650 mL    Total NET: 190 mL      14 Jul 2017 07:01  -  15 Jul 2017 00:44  --------------------------------------------------------  IN:    sodium chloride 0.9%: 1400 mL    Solution: 100 mL  Total IN: 1500 mL    OUT:    Voided: 2200 mL  Total OUT: 2200 mL    Total NET: -700 mL                MEDICATIONS  (STANDING):  levETIRAcetam  IVPB 500 milliGRAM(s) IV Intermittent every 12 hours  dexamethasone  IVPB 10 milliGRAM(s) IV Intermittent every 12 hours    MEDICATIONS  (PRN):  acetaminophen   Tablet. 650 milliGRAM(s) Oral every 6 hours PRN Mild Pain (1 - 3)          Physical Exam:    Gen: NAD    Eyes: periorbital swelling of right eye, +rt sided subconjunctival hemorrhage    Neurological: alert and oriented x3, no gross deficits    Neck: FROM without pain, trachea midline, no JVD    Pulmonary: CTAB    Cardiovascular: S1S2    Gastrointestinal: soft, NTTP    Extremities: without c/c/e    Skin: Intact    Musculoskeletal: FROM without pain    LABS:  Pending    ASSESSMENT/PLAN:  67y Male with SAH, SDH, right orbital fracture    Neuro: Continue keppra for a total of 7 days, continue neuro checks    CV: No acute issues    Pulm: Encourage incentive spirometry, OOB as tolerated     GI/Nutrition: Continue regular diet    /Renal: Voiding    ID: No issues    Endo: Decadron as per plastic surgery    DVT Prophylaxis: SCDs only
INTERVAL HPI/OVERNIGHT EVENTS/SUBJECTIVE: 68 yo male  with SAH, R orbital fx downgraded from the ICU yesterday. Pt offers no new complaints at this time. Denies headaches, double/blurred vision, chest pain, SOB, nausea or vomiting. Patient scheduled to go for sx tomorrow for orbital fx repair.     ICU Vital Signs Last 24 Hrs  T(C): 36.7 (16 Jul 2017 07:40), Max: 36.9 (16 Jul 2017 04:33)  T(F): 98 (16 Jul 2017 07:40), Max: 98.5 (16 Jul 2017 04:33)  HR: 56 (16 Jul 2017 07:40) (56 - 75)  BP: 138/76 (16 Jul 2017 07:40) (129/72 - 151/88)  BP(mean): 95 (15 Jul 2017 10:00) (95 - 95)  RR: 18 (16 Jul 2017 07:40) (16 - 45)  SpO2: 93% (16 Jul 2017 07:40) (93% - 97%)      MEDICATIONS  (STANDING):  levETIRAcetam  IVPB 500 milliGRAM(s) IV Intermittent every 12 hours  dexamethasone  IVPB 10 milliGRAM(s) IV Intermittent every 12 hours    MEDICATIONS  (PRN):  acetaminophen   Tablet. 650 milliGRAM(s) Oral every 6 hours PRN Mild Pain (1 - 3)      Physical Exam:    Gen: NAD    Eyes: periorbital swelling of right eye, +rt sided subconjunctival hemorrhage    Neurological: alert and oriented x3, no gross deficits    Neck: FROM without pain, trachea midline, no JVD    Pulmonary: CTAB    Cardiovascular: S1S2    Gastrointestinal: soft, NTTP    Extremities: without edema    Skin: Intact    Musculoskeletal: FROM without pain    LABS:      07-16    141  |  102  |  17.0  ----------------------------<  169<H>  4.0   |  27.0  |  0.66    Ca    9.7      16 Jul 2017 06:19  Phos  3.4     07-16  Mg     1.9     07-16           12.7   6.9   )-----------( 236      ( 16 Jul 2017 06:19 )             37.2
INTERVAL HPI/OVERNIGHT EVENTS:  68 y/o male s/p fall admitted with small acute HH1 MF1 traumatic SAH high right frontal region, small acute traumatic SDH in the anterior interhemispheric region, and multiple maxillofacial fractures, post bleed day #3. Patient seen lying comfortably in bed without any complaints. States he is feeling well. Scheduled for surgery tomorrow for orbital repair by plastic surgery. Denies headache, salty taste in the back of his mouth, rhinorrhea, otorrhea, n/v/d, chest pain, palpitations, SOB. There has not been any repeat imaging since the stable repeat CT head on 7/13/17.    Vital Signs Last 24 Hrs  T(C): 36.7 (16 Jul 2017 07:40), Max: 36.9 (16 Jul 2017 04:33)  T(F): 98 (16 Jul 2017 07:40), Max: 98.5 (16 Jul 2017 04:33)  HR: 56 (16 Jul 2017 07:40) (56 - 68)  BP: 138/76 (16 Jul 2017 07:40) (129/72 - 151/88)  RR: 18 (16 Jul 2017 07:40) (16 - 19)  SpO2: 93% (16 Jul 2017 07:40) (93% - 96%)    PHYSICAL EXAM:  GENERAL: NAD, well-groomed, well-developed  HEAD:  R periorbital edema/ecchymosis, R supraorbital abrasion, R conjunctival hemorrhage laterally; nasal abrasion  MENTAL STATUS: AAO x3; Awake; Appropriately conversant without aphasia; following commands  CRANIAL NERVES: PERRL b/l; EOMI b/l; no facial asymmetry; facial sensation grossly intact to light touch b/l;  tongue midline  MOTOR: strength 5/5 B/L upper and lower extremities; sensation grossly intact all extremities, no pronator drift  CHEST/LUNG: Clear to auscultation bilaterally  HEART: +S1/+S2; Regular rate and rhythm  ABDOMEN: Soft, nontender, nondistended; bowel sounds present all four quadrants  SKIN: Warm, dry; no rashes or lesions    LABS:                        12.7   6.9   )-----------( 236      ( 16 Jul 2017 06:19 )             37.2     07-16    141  |  102  |  17.0  ----------------------------<  169<H>  4.0   |  27.0  |  0.66    Ca    9.7      16 Jul 2017 06:19  Phos  3.4     07-16  Mg     1.9     07-16      07-15 @ 07:01  -  07-16 @ 07:00  --------------------------------------------------------  IN: 150 mL / OUT: 550 mL / NET: -400 mL
INTERVAL HPI/OVERNIGHT EVENTS:  Pt is awake, alert, resp, oriented x3 Denies headache.   Following Commands throughout    Vital Signs Last 24 Hrs  T(C): 36.8 (14 Jul 2017 12:00), Max: 36.8 (13 Jul 2017 16:00)  T(F): 98.3 (14 Jul 2017 12:00), Max: 98.3 (13 Jul 2017 16:00)  HR: 81 (14 Jul 2017 13:00) (67 - 90)  BP: 145/72 (14 Jul 2017 13:00) (114/56 - 168/81)  BP(mean): 101 (14 Jul 2017 13:00) (78 - 115)  RR: 23 (14 Jul 2017 13:00) (16 - 32)  SpO2: 93% (14 Jul 2017 13:00) (92% - 98%)    PHYSICAL EXAM:    GENERAL: NAD, well-groomed, well-developed  HEAD:  Atraumatic, Normocephalic  EYES: EOMI, PERRLA, conjunctiva and sclera clear  ENMT: Pos ecchymosis of the right eye, pos swelling.  No facial  NECK: Supple, No JVD,   NERVOUS SYSTEM:  Alert & Oriented X3, Good concentration; Motor Strength 5/5 B/L upper and lower extremities;   CHEST/LUNG: Clear BS bilaterally; No rales, rhonchi, wheezing, or rubs  HEART: Regular rate and rhythm; No murmurs, rubs, or gallops  ABDOMEN: Soft, Nontender, Nondistended; Bowel sounds present  EXTREMITIES:  2+ Peripheral Pulses, No edema      MEDICATIONS  (STANDING):  sodium chloride 0.9%. 1000 milliLiter(s) (100 mL/Hr) IV Continuous <Continuous>  levETIRAcetam  IVPB 500 milliGRAM(s) IV Intermittent every 12 hours    MEDICATIONS  (PRN):  acetaminophen   Tablet. 650 milliGRAM(s) Oral every 6 hours PRN Mild Pain (1 - 3)      Allergies    No Known Allergies    Intolerances        LABS:                        12.9   6.5   )-----------( 245      ( 14 Jul 2017 04:35 )             37.9     07-14    141  |  104  |  19.0  ----------------------------<  125<H>  4.0   |  25.0  |  0.72    Ca    9.0      14 Jul 2017 04:35  Phos  3.3     07-14  Mg     1.9     07-14    TPro  7.4  /  Alb  4.8  /  TBili  0.7  /  DBili  x   /  AST  44<H>  /  ALT  25  /  AlkPhos  63  07-13    PT/INR - ( 13 Jul 2017 14:45 )   PT: 11.8 sec;   INR: 1.07 ratio         PTT - ( 13 Jul 2017 14:45 )  PTT:30.5 sec      RADIOLOGY & ADDITIONAL TESTS:  < from: CT Head No Cont (07.13.17 @ 20:22) >   EXAM:  CT BRAIN                          PROCEDURE DATE:  07/13/2017      < end of copied text >  < from: CT Head No Cont (07.13.17 @ 20:22) >    Impression: Frontal bone fractures. Right orbital fracture. Subarachnoid   hemorrhage in frontal region unchanged.    No change in the appearance of the brain since the prior exam  < from: CT Maxillofacial No Cont (07.13.17 @ 13:45) >   EXAM:  CT MAXILLOFACIAL                         EXAM:  CT CERVICAL SPINE                         EXAM:  CT BRAIN                          PROCEDURE DATE:  07/13/2017        < end of copied text >  < from: CT Maxillofacial No Cont (07.13.17 @ 13:45) >  Extensive soft tissue swelling/hematoma is seen involving the right   periorbital and frontal region.    Both globes appear normal and symmetric.    There is evidence of a depressed/angulated fracture involving the lateral   wall of the right orbit.    Irregularity is seen involving medial wall of the left orbit which is   likely compatible with a fracture as well. There is evidence of a   fracture involving the floor of the right orbit. The inferior rectus   muscle does appear to abut the fracture fragment with herniation of   intraorbital fat through the fracture site. This finding could be   compatible with entrapment. Please correlate clinically.    Depressed fracture with displacement of fracture fragments are seen   involving the lateral wall of the right maxillary sinus.    Mildly displaced fracture is seen involving the right nasal bone.    Both globes appear normal and symmetric.    Impression: Small acute subdural hematoma is seen in the anterior   interhemispheric region.    Subarachnoid hemorrhage is seen involving the high right frontal region.    < end of copied text >      < end of copied text >
INTERVAL HPI/OVERNIGHT EVENTS: No acute events overnight , patient is doing well with no complaints, denies any fever, chills, headache, diplopia, nausea, vomiting.        MEDICATIONS  (STANDING):  levETIRAcetam  IVPB 500 milliGRAM(s) IV Intermittent every 12 hours  enoxaparin Injectable 40 milliGRAM(s) SubCutaneous daily    MEDICATIONS  (PRN):  acetaminophen   Tablet. 650 milliGRAM(s) Oral every 6 hours PRN Mild Pain (1 - 3)      Vital Signs Last 24 Hrs  T(C): 36.7 (17 Jul 2017 08:32), Max: 36.9 (17 Jul 2017 04:48)  T(F): 98 (17 Jul 2017 08:32), Max: 98.5 (17 Jul 2017 04:48)  HR: 58 (17 Jul 2017 08:32) (57 - 78)  BP: 135/76 (17 Jul 2017 08:32) (113/53 - 141/81)  BP(mean): --  RR: 18 (17 Jul 2017 08:32) (18 - 18)  SpO2: 95% (17 Jul 2017 08:32) (94% - 95%)    Physical Exam:    Gen: NAD    Eyes: periorbital swelling of right eye, +rt sided subconjunctival hemorrhage    Neurological: alert and oriented x3, no gross deficits    Neck: FROM without pain, trachea midline, no JVD    Pulmonary: CTAB    Cardiovascular: S1S2    Gastrointestinal: soft, NTTP    Extremities: without edema    Skin: Intact      I&O's Detail    16 Jul 2017 07:01  -  17 Jul 2017 07:00  --------------------------------------------------------  IN:    Solution: 50 mL    Solution: 200 mL  Total IN: 250 mL    OUT:  Total OUT: 0 mL    Total NET: 250 mL          LABS:                        12.4   10.2  )-----------( 261      ( 17 Jul 2017 11:37 )             36.6     07-17    139  |  100  |  20.0  ----------------------------<  169<H>  4.0   |  27.0  |  0.67    Ca    9.7      17 Jul 2017 06:45  Phos  3.3     07-17  Mg     2.1     07-17            RADIOLOGY & ADDITIONAL STUDIES:
Pt seen POD 1 ORIF right orbital and zmc fx.  Pain controlled with tylenol.  Moderate periorbital ecchymosis mild edema  Tarsorrhaphy sutures secure in right eye   Intraoral incision CDI  Nasal splint intact    AP  DC home today  Peridex TID  Bacitracin optho ointment BID  Soft puree diet  Decadron taper dose pack for home  See DR Kapadia Thursday 7/20  Ice right eye for three days as tolerated

## 2017-07-18 NOTE — DISCHARGE NOTE ADULT - CARE PLAN
Principal Discharge DX:	Fracture of maxillary sinus, closed, initial encounter  Goal:	Alleviation of pain and symptoms  Instructions for follow-up, activity and diet:	Follow up: Please call and make an appointment with DR. RODRIGUEZ this THURSDAY, 7/20/2017. Also, please call and make an appointment with your primary care physician as per your usual schedule.   Activity: May return to normal activities as tolerated.  Diet: May continue regular diet.  Medications: Please take all home medications as prescribed by your primary care doctor. Pain medication has been prescribed for you (PERCOCET). Please, take it as it has been prescribed, do not drive or operate heavy machinery while taking narcotics.  You are encouraged to take over-the-counter tylenol and/or ibuprofen for pain relief when you feel your pain no longer warrants the use of narcotic pain medications, however DO NOT TAKE percocet and tylenol at the same time as they contain the same active ingredient (acetaminophen). Take only percocet OR tylenol. You have also been prescribed an antibiotic (cephalexin), a medrol dose pack, bacitracin ophthalamic, and peridex. Please take all prescribed medications as instructed. Do not stop medrol dose pack or antibiotic course early or skip doses.  Wound Care: Please, keep wound site clean and dry. You may shower, but do not bathe. Please KEEP FACE DRY until seen at your follow-up appointment with Dr. Rodriguez. Continue to place bacitracin ophthalmiac ointment over your lower right eye lid twice a day.  If confusion, altered mental status, fever, chest pain, shortness of breath, new or worsening pain, vomiting, change or worsening of medical status, please come back to the emergency room, and in case of emergency call 911.  Secondary Diagnosis:	Subarachnoid hemorrhage following injury, with LOC of 30 min or less, initial encounter  Instructions for follow-up, activity and diet:	You have been prescribed KEPPRA for 3 more days to finish a 7 day course. This was started in the hospital for seizure prevention due to your head bleed. Principal Discharge DX:	Fracture of maxillary sinus, closed, initial encounter  Goal:	Alleviation of pain and symptoms  Instructions for follow-up, activity and diet:	Follow up: Please call and make an appointment with DR. RODRIGUEZ this THURSDAY, 7/20/2017. Also, please call and make an appointment with your primary care physician as per your usual schedule.   Activity: May return to normal activities as tolerated.  Diet: Please maintain a soft / pureed diet until seen at your follow-up appointment  Medications: Please take all home medications as prescribed by your primary care doctor. Pain medication has been prescribed for you (PERCOCET). Please, take it as it has been prescribed, do not drive or operate heavy machinery while taking narcotics.  You are encouraged to take over-the-counter tylenol and/or ibuprofen for pain relief when you feel your pain no longer warrants the use of narcotic pain medications, however DO NOT TAKE percocet and tylenol at the same time as they contain the same active ingredient (acetaminophen). Take only percocet OR tylenol. You have also been prescribed an antibiotic (cephalexin), a medrol dose pack, bacitracin ophthalamic, and peridex. Please take all prescribed medications as instructed. Do not stop medrol dose pack or antibiotic course early or skip doses.  Wound Care: Please, keep wound site clean and dry. You may shower, but do not bathe. Please KEEP FACE DRY until seen at your follow-up appointment with Dr. Rodriguez. Continue to place bacitracin ophthalmiac ointment over your lower right eye lid twice a day.  If confusion, altered mental status, fever, chest pain, shortness of breath, new or worsening pain, vomiting, change or worsening of medical status, please come back to the emergency room, and in case of emergency call 911.  Secondary Diagnosis:	Subarachnoid hemorrhage following injury, with LOC of 30 min or less, initial encounter  Instructions for follow-up, activity and diet:	You have been prescribed KEPPRA for 3 more days to finish a 7 day course. This was started in the hospital for seizure prevention due to your head bleed.

## 2017-07-18 NOTE — DISCHARGE NOTE ADULT - MEDICATION SUMMARY - MEDICATIONS TO TAKE
I will START or STAY ON the medications listed below when I get home from the hospital:    Medrol Dosepak 4 mg oral tablet  -- 1 packet(s) by mouth once a day - please take exactly as instructed on the packet  -- It is very important that you take or use this exactly as directed.  Do not skip doses or discontinue unless directed by your doctor.  Obtain medical advice before taking any non-prescription drugs as some may affect the action of this medication.  Take with food or milk.    -- Indication: For Steriod taper s/p surgery    Percocet 5/325 oral tablet  -- 1 tab(s) by mouth every 4-6 hours, As Needed -for severe pain MDD:6  -- Caution federal law prohibits the transfer of this drug to any person other  than the person for whom it was prescribed.  May cause drowsiness.  Alcohol may intensify this effect.  Use care when operating dangerous machinery.  This prescription cannot be refilled.  This product contains acetaminophen.  Do not use  with any other product containing acetaminophen to prevent possible liver damage.  Using more of this medication than prescribed may cause serious breathing problems.    -- Indication: For Pain    levETIRAcetam 500 mg oral tablet  -- 1 tab(s) by mouth 2 times a day  -- Indication: For Seizure prophylaxis    Peridex 0.12% mucous membrane liquid  -- 15 milliliter(s) mucous membrane 3 times a day  -- Indication: For Oral hygiene    cephalexin 250 mg oral capsule  -- 1 cap(s) by mouth every 6 hours  -- Indication: For Infection prophylaxis    bacitracin 500 units/g ophthalmic ointment  -- 1 application to each affected eye 2 times a day  -- Indication: For Infection prophylaxis

## 2017-07-18 NOTE — DISCHARGE NOTE ADULT - PLAN OF CARE
Alleviation of pain and symptoms Follow up: Please call and make an appointment with DR. RODRIGUEZ this THURSDAY, 7/20/2017. Also, please call and make an appointment with your primary care physician as per your usual schedule.   Activity: May return to normal activities as tolerated.  Diet: May continue regular diet.  Medications: Please take all home medications as prescribed by your primary care doctor. Pain medication has been prescribed for you (PERCOCET). Please, take it as it has been prescribed, do not drive or operate heavy machinery while taking narcotics.  You are encouraged to take over-the-counter tylenol and/or ibuprofen for pain relief when you feel your pain no longer warrants the use of narcotic pain medications, however DO NOT TAKE percocet and tylenol at the same time as they contain the same active ingredient (acetaminophen). Take only percocet OR tylenol. You have also been prescribed an antibiotic (cephalexin), a medrol dose pack, bacitracin ophthalamic, and peridex. Please take all prescribed medications as instructed. Do not stop medrol dose pack or antibiotic course early or skip doses.  Wound Care: Please, keep wound site clean and dry. You may shower, but do not bathe. Please KEEP FACE DRY until seen at your follow-up appointment with Dr. Rodriguez. Continue to place bacitracin ophthalmiac ointment over your lower right eye lid twice a day.  If confusion, altered mental status, fever, chest pain, shortness of breath, new or worsening pain, vomiting, change or worsening of medical status, please come back to the emergency room, and in case of emergency call 911. You have been prescribed KEPPRA for 3 more days to finish a 7 day course. This was started in the hospital for seizure prevention due to your head bleed. Follow up: Please call and make an appointment with DR. RODRIGUEZ this THURSDAY, 7/20/2017. Also, please call and make an appointment with your primary care physician as per your usual schedule.   Activity: May return to normal activities as tolerated.  Diet: Please maintain a soft / pureed diet until seen at your follow-up appointment  Medications: Please take all home medications as prescribed by your primary care doctor. Pain medication has been prescribed for you (PERCOCET). Please, take it as it has been prescribed, do not drive or operate heavy machinery while taking narcotics.  You are encouraged to take over-the-counter tylenol and/or ibuprofen for pain relief when you feel your pain no longer warrants the use of narcotic pain medications, however DO NOT TAKE percocet and tylenol at the same time as they contain the same active ingredient (acetaminophen). Take only percocet OR tylenol. You have also been prescribed an antibiotic (cephalexin), a medrol dose pack, bacitracin ophthalamic, and peridex. Please take all prescribed medications as instructed. Do not stop medrol dose pack or antibiotic course early or skip doses.  Wound Care: Please, keep wound site clean and dry. You may shower, but do not bathe. Please KEEP FACE DRY until seen at your follow-up appointment with Dr. Rodriguez. Continue to place bacitracin ophthalmiac ointment over your lower right eye lid twice a day.  If confusion, altered mental status, fever, chest pain, shortness of breath, new or worsening pain, vomiting, change or worsening of medical status, please come back to the emergency room, and in case of emergency call 911.

## 2017-07-18 NOTE — DISCHARGE NOTE ADULT - HOSPITAL COURSE
68 y/o with right facial pain around eye.  Began suddenly several hours ago.  Context is pt fell from top of a ladder while trying to climb onto roof.  Remembers the ladder shifting out from under him but not the fall.  Was ambulatory following event.  Denies N/V.  No CP/SOB/spine pain/weakness of sensory loss.  No visual disturbances.      Hospital Course: CT cervical spine showed no fractures or dislocations are seen involving the cervical spine. CT head / maxillofacial showed small acute subdural hematoma is seen in the anterior interhemispheric region; subarachnoid hemorrhage is seen involving the high right frontal region; fracture is seen involving the anterior and posterior wall of the right frontal sinus with extension into the right frontal calvarium. Patient was admitted to the trauma service and neurosurgery and plastic surgery consulted. Neurosx states no acute neurosurgery intervention, q1hr neurochecks in the SICU, and seizure ppx with keppra x7 days. Repeat CT head was read as no change in the appearance of the brain since the prior exam. Patient remained neurologically intact with no deterioration in mental status during his admission. Plastic surgery took patient to the OR on 7/17 for ORIF of multiple facial fxs. Procedure completed without complication and pt transferred to PACU and floor in stable condition. Post-operatively, pt was placed on steroid taper, abx, peridex, bacitracin ophthalamic. He remains HD well and pain controlled on oral medications, tolerating diet, OOB ambulating independently. He is stable for discharge at this time with outpatient follow-up as outlined above.    Patient is advised to RETURN TO THE EMERGENCY DEPARTMENT for any of the following - worsening pain, fever/chills, nausea/vomiting, altered mental status, chest pain, shortness of breath, or any other new / worsening symptom.

## 2017-07-18 NOTE — DISCHARGE NOTE ADULT - PATIENT PORTAL LINK FT
“You can access the FollowHealth Patient Portal, offered by [facility], by registering with the following website: [email or web address]”

## 2017-08-07 PROBLEM — Z00.00 ENCOUNTER FOR PREVENTIVE HEALTH EXAMINATION: Status: ACTIVE | Noted: 2017-08-07

## 2017-08-21 ENCOUNTER — APPOINTMENT (OUTPATIENT)
Dept: NEUROSURGERY | Facility: CLINIC | Age: 67
End: 2017-08-21
Payer: MEDICARE

## 2017-08-21 VITALS
DIASTOLIC BLOOD PRESSURE: 70 MMHG | WEIGHT: 175 LBS | HEART RATE: 68 BPM | HEIGHT: 69 IN | OXYGEN SATURATION: 93 % | SYSTOLIC BLOOD PRESSURE: 128 MMHG | TEMPERATURE: 98.2 F | BODY MASS INDEX: 25.92 KG/M2

## 2017-08-21 DIAGNOSIS — S06.6X9A TRAUMATIC SUBARACHNOID HEMORRHAGE WITH LOSS OF CONSCIOUSNESS OF UNSPECIFIED DURATION, INITIAL ENCOUNTER: ICD-10-CM

## 2017-08-21 DIAGNOSIS — Z78.9 OTHER SPECIFIED HEALTH STATUS: ICD-10-CM

## 2017-08-21 DIAGNOSIS — Z80.0 FAMILY HISTORY OF MALIGNANT NEOPLASM OF DIGESTIVE ORGANS: ICD-10-CM

## 2017-08-21 PROCEDURE — 99213 OFFICE O/P EST LOW 20 MIN: CPT

## 2017-09-05 ENCOUNTER — FORM ENCOUNTER (OUTPATIENT)
Age: 67
End: 2017-09-05

## 2017-09-06 ENCOUNTER — OUTPATIENT (OUTPATIENT)
Dept: OUTPATIENT SERVICES | Facility: HOSPITAL | Age: 67
LOS: 1 days | End: 2017-09-06
Payer: MEDICARE

## 2017-09-06 ENCOUNTER — APPOINTMENT (OUTPATIENT)
Dept: CT IMAGING | Facility: CLINIC | Age: 67
End: 2017-09-06
Payer: MEDICARE

## 2017-09-06 DIAGNOSIS — Z00.8 ENCOUNTER FOR OTHER GENERAL EXAMINATION: ICD-10-CM

## 2017-09-06 DIAGNOSIS — S06.6X9A TRAUMATIC SUBARACHNOID HEMORRHAGE WITH LOSS OF CONSCIOUSNESS OF UNSPECIFIED DURATION, INITIAL ENCOUNTER: ICD-10-CM

## 2017-09-06 PROCEDURE — 70450 CT HEAD/BRAIN W/O DYE: CPT

## 2017-09-06 PROCEDURE — 70450 CT HEAD/BRAIN W/O DYE: CPT | Mod: 26

## 2017-09-25 ENCOUNTER — APPOINTMENT (OUTPATIENT)
Dept: NEUROSURGERY | Facility: CLINIC | Age: 67
End: 2017-09-25
Payer: MEDICARE

## 2017-09-25 VITALS
OXYGEN SATURATION: 98 % | TEMPERATURE: 98.5 F | HEIGHT: 69 IN | HEART RATE: 72 BPM | SYSTOLIC BLOOD PRESSURE: 116 MMHG | BODY MASS INDEX: 25.92 KG/M2 | WEIGHT: 175 LBS | DIASTOLIC BLOOD PRESSURE: 70 MMHG

## 2017-09-25 PROCEDURE — 99214 OFFICE O/P EST MOD 30 MIN: CPT

## 2017-12-11 NOTE — PHYSICAL THERAPY INITIAL EVALUATION ADULT - MANUAL MUSCLE TESTING RESULTS, REHAB EVAL
Discharge Planning Assessment  [facility]     Patient Name: Bowen Concepcion  MRN: [id number]  Today's Date: 12/11/2017    Admit Date: 12/9/2017          Discharge Needs Assessment       12/11/17 1722    Living Environment    Lives With alone    Living Arrangements other (see comments)   patio home    Home Accessibility no concerns    Type of Financial/Environmental Concern none    Transportation Available family or friend will provide    Living Environment    Provides Primary Care For no one    Discharge Needs Assessment    Concerns To Be Addressed no discharge needs identified;denies needs/concerns at this time    Readmission Within The Last 30 Days no previous admission in last 30 days    Equipment Currently Used at Home none            Discharge Plan       12/11/17 1723    Case Management/Social Work Plan    Plan Home    Patient/Family In Agreement With Plan yes    Additional Comments Facesheet verified. IMM documented.  Patient lives alone in a patio home.  She prefers [facility] in [place].  She has no DME.  Has used outpatient PT.  Area list provided for choices for rehab if needed.  Anticipated DC to home without needs.  CCP will follow and assist..........................Rupal Kennedy RN        Discharge Placement     No information found                Demographic Summary       12/11/17 1721    Primary Care Physician Information    Name Aubrey Pitts MD    Phone [phone]      12/11/17 1720    Referral Information    Admission Type inpatient    Arrived From home or self-care    Contact Information    Permission Granted to Share Information With family/designee    Comments Sister Maxine Concepcion            Functional Status       12/11/17 1721    Functional Status Current    Ambulation 0-->independent    Transferring 0-->independent    Toileting 0-->independent    Bathing 0-->independent    Dressing 0-->independent    Eating 0-->independent    Communication 0-->understands/communicates without  difficulty    Functional Status Prior    Ambulation 0-->independent    Transferring 0-->independent    Toileting 0-->independent    Bathing 0-->independent    Dressing 0-->independent    Eating 0-->independent    Communication 0-->understands/communicates without difficulty    Swallowing 0-->swallows foods/liquids without difficulty    IADL    Medications independent    Meal Preparation independent    Housekeeping independent    Laundry independent    Shopping independent    Oral Care independent    Activity Tolerance    Usual Activity Tolerance good    Current Activity Tolerance fair    Cognitive/Perceptual/Developmental    Current Mental Status/Cognitive Functioning no deficits noted            Psychosocial     None            Abuse/Neglect     None            Legal     None            Substance Abuse     None            Patient Forms     None          Rupal Kennedy, RN     no strength deficits were identified

## 2018-12-21 NOTE — BRIEF OPERATIVE NOTE - POST-OP DX
EXAM DESCRIPTION: Ankle,Right 3 x-ray Views



CLINICAL HISTORY: 68 years, Female, PAIN IN RIGHT ANKLE AND

JOINTS OF RIGHT FOOT



COMPARISON: Previous two view x-ray left ankle December 15, 2018



TECHNIQUE: AP/lateral/oblique of the left ankle



FINDINGS:



Oblique view shows fracture through the medial malleolus

extending into the ankle joint. There is rarefaction of bone

around the fracture which may be resorption related to healing or

could indicate a preexistent lucent lesion. However no lesion was

seen on the previous study December 15, 2018. There is mild

medial and inferior displacement of the fragment. Cortical offset

medially measures 3 mm. The lateral malleolus appears intact.

Intact patellar dome. Talus and calcaneus appear intact. No

midfoot malalignment. Lateral view shows anterior angulation of

the fracture fragment with distraction approximately 8 mm.



IMPRESSION:



Medial malleolar fracture with moderate displacement.



Electronically signed by:  Nabor Nugent MD  12/21/2018 9:42

[facility] Workstation: [id number]
Zygomatic fracture, right side, initial encounter for open fracture  07/17/2017  Right [facility] fracture. Nasal fracture  Active  Elodia Kapadia A

## 2020-01-30 NOTE — DISCHARGE NOTE ADULT - DO YOU HAVE DIFFICULTY CLIMBING STAIRS
[facility] [employer] rep Darrian Morales called stating that the Approval was fixed and that the override for the Ajovy has been placed in the system. However, patient's plan only allows the 90 day Ajovy to be filled at a [facility] pharmacy (retail or mail order). Called patient to inform her of this. She asked if we could check with [facility] to see if they can still fill her 90 day Rx. [facility], and they confirmed they cannot fill it. Please send 90 days Rx to [facility] in patient's file ([address]) as soon as possible. Patient has been without preventive medication for a while and would like the Rx filled as soon as possible. No

## 2021-01-15 NOTE — PATIENT PROFILE ADULT. - TEACHING/LEARNING FACTORS INFLUENCE READINESS TO LEARN
[de-identified] : \par I discussed the treatment of degenerative arthritis with the patient at length today. I described the spectrum of treatment from nonoperative modalities to total joint arthroplasty. Noninvasive and nonoperative treatment modalities include weight reduction, activity modification with low impact exercise, PRN use of acetaminophen or anti-inflammatory medication if tolerated, glucosamine/chondroitin supplements, and physical therapy. Further treatments can include corticosteroid injection and the use of hyaluronic acid injections. Definitive treatment can certainly include total joint arthroplasty also. The risks and benefits of each treatment options was discussed and all questions were answered.\par \par Injection: Right glenohumeral joint.\par Indication: [Arthritis\par A discussion was had with the patient regarding this procedure and all questions were answered. All risks, benefits and alternatives were discussed. These included but were not limited to bleeding, infection, and allergic reaction. Alcohol was used to clean the skin, and betadine was used to sterilize and prep the area in the posterior aspect of the right shoulder. Ethyl chloride spray was then used as a topical anesthetic. A 21-gauge needle was used to inject 7cc of 1% lidocaine and 1cc of 40mg/ml methylprednisolone into the right glenohumeral joint. A sterile bandage was then applied. The patient tolerated the procedure well and there were no complications. 
none

## 2021-05-28 ENCOUNTER — OUTPATIENT (OUTPATIENT)
Dept: OUTPATIENT SERVICES | Facility: HOSPITAL | Age: 71
LOS: 1 days | End: 2021-05-28
Payer: MEDICARE

## 2021-05-28 ENCOUNTER — APPOINTMENT (OUTPATIENT)
Dept: CT IMAGING | Facility: CLINIC | Age: 71
End: 2021-05-28
Payer: MEDICARE

## 2021-05-28 DIAGNOSIS — Z00.8 ENCOUNTER FOR OTHER GENERAL EXAMINATION: ICD-10-CM

## 2021-05-28 PROCEDURE — 74178 CT ABD&PLV WO CNTR FLWD CNTR: CPT

## 2021-05-28 PROCEDURE — 82565 ASSAY OF CREATININE: CPT

## 2021-05-28 PROCEDURE — 74178 CT ABD&PLV WO CNTR FLWD CNTR: CPT | Mod: 26

## 2022-07-05 ENCOUNTER — APPOINTMENT (OUTPATIENT)
Dept: PULMONOLOGY | Facility: CLINIC | Age: 72
End: 2022-07-05

## 2022-08-25 DIAGNOSIS — Z01.811 ENCOUNTER FOR PREPROCEDURAL RESPIRATORY EXAMINATION: ICD-10-CM

## 2022-08-30 LAB — SARS-COV-2 N GENE NPH QL NAA+PROBE: NOT DETECTED

## 2022-08-31 ENCOUNTER — TRANSCRIPTION ENCOUNTER (OUTPATIENT)
Age: 72
End: 2022-08-31

## 2022-08-31 ENCOUNTER — APPOINTMENT (OUTPATIENT)
Dept: PULMONOLOGY | Facility: CLINIC | Age: 72
End: 2022-08-31

## 2022-08-31 VITALS — WEIGHT: 180 LBS | HEIGHT: 67 IN | BODY MASS INDEX: 28.25 KG/M2

## 2022-08-31 PROCEDURE — 85018 HEMOGLOBIN: CPT | Mod: QW

## 2022-08-31 PROCEDURE — 94010 BREATHING CAPACITY TEST: CPT

## 2022-08-31 PROCEDURE — 94727 GAS DIL/WSHOT DETER LNG VOL: CPT

## 2022-08-31 PROCEDURE — 94729 DIFFUSING CAPACITY: CPT

## 2022-09-15 ENCOUNTER — APPOINTMENT (OUTPATIENT)
Dept: PULMONOLOGY | Facility: CLINIC | Age: 72
End: 2022-09-15

## 2022-09-15 VITALS
RESPIRATION RATE: 16 BRPM | SYSTOLIC BLOOD PRESSURE: 126 MMHG | OXYGEN SATURATION: 97 % | BODY MASS INDEX: 26.22 KG/M2 | WEIGHT: 177 LBS | HEIGHT: 69 IN | DIASTOLIC BLOOD PRESSURE: 78 MMHG | HEART RATE: 84 BPM

## 2022-09-15 DIAGNOSIS — R06.02 SHORTNESS OF BREATH: ICD-10-CM

## 2022-09-15 PROCEDURE — 99204 OFFICE O/P NEW MOD 45 MIN: CPT

## 2022-09-15 RX ORDER — ALBUTEROL SULFATE 90 UG/1
108 (90 BASE) AEROSOL, METERED RESPIRATORY (INHALATION)
Qty: 1 | Refills: 5 | Status: ACTIVE | COMMUNITY
Start: 2022-09-15 | End: 1900-01-01

## 2022-09-15 RX ORDER — CHROMIUM 200 MCG
TABLET ORAL
Refills: 0 | Status: ACTIVE | COMMUNITY

## 2022-09-15 RX ORDER — COLD-HOT PACK
EACH MISCELLANEOUS
Refills: 0 | Status: ACTIVE | COMMUNITY

## 2022-09-15 RX ORDER — ATORVASTATIN CALCIUM 80 MG/1
TABLET, FILM COATED ORAL
Refills: 0 | Status: ACTIVE | COMMUNITY

## 2022-09-15 RX ORDER — MULTIVIT-MIN/IRON/FOLIC ACID/K 18-600-40
CAPSULE ORAL
Refills: 0 | Status: ACTIVE | COMMUNITY

## 2022-09-15 NOTE — REVIEW OF SYSTEMS
[Fatigue] : fatigue [Recent Wt Loss (___ Lbs)] : ~T recent [unfilled] lb weight loss [SOB on Exertion] : sob on exertion [Seasonal Allergies] : seasonal allergies [Back Pain] : back pain [Diabetes] : diabetes [Fever] : no fever [Recent Wt Gain (___ Lbs)] : ~T no recent weight gain [Chills] : no chills [Cough] : no cough [Hemoptysis] : no hemoptysis [Chest Tightness] : no chest tightness [Sputum] : no sputum [Dyspnea] : no dyspnea [Pleuritic Pain] : no pleuritic pain [Wheezing] : no wheezing [GERD] : no gerd [Abdominal Pain] : no abdominal pain [Nausea] : no nausea [Vomiting] : no vomiting [Dysphagia] : no dysphagia [Bleeding] : no bleeding [Myalgias] : no myalgias [Chronic Pain] : no chronic pain [Rash] : no rash [Blood Transfusion] : no blood transfusion [Clotting Disorder/ Frequent bleeding] : no clotting disorder/ frequent bleeding [Depression] : no depression [Anxiety] : no anxiety [Panic Attacks] : no panic attacks [Obesity] : no obesity [TextBox_3] : recent dx DM       weight loos program  [TextBox_30] : one  flight     [facility]  [TextBox_134] : only planes    [TextBox_141] : new  DM  dx

## 2022-09-15 NOTE — HISTORY OF PRESENT ILLNESS
[Never] : never [Daytime Somnolence] : daytime somnolence [Difficulty Maintaining Sleep] : difficulty maintaining sleep [Fatigue] : fatigue [Snoring] : snoring [TextBox_4] : 71y/o   male  born in [place]   never smoker  occupational -     (  did not wear mask in past 16 years)  one dog\par - cardiology  followed   for arrhythmia  PVC     here for  evaluation \par \par -no h/o   pneumonia no asthma\par - ride bycycle       miles walk   no issues \par \par - 9/2021  hernia repair  with no issues \par h/o  covid  2year  ago  then  two  months  ago  + covid  sore throat   ( moderna)  [Awakes with Dry Mouth] : does not awaken with dry mouth [Awakes with Headache] : does not awaken with headache [Difficulty Initiating Sleep] : does not have difficulty initiating sleep [TextBox_77] : 10 [TextBox_79] : 6 [TextBox_81] : 5 min  [TextBox_83] : 1 [ESS] : 5

## 2022-09-15 NOTE — ASSESSMENT
[FreeTextEntry1] : 71y/o male   never smoker  retried  \par \par 1-   sleep disordered breathing    ? joseph    snoring\par 2-   PVC  per cardiology   ? etiology\par 3- DUMONT  /  small airway obstruction /  restrictive defect  from kyphosis \par 4- vaccinations   covid +   (  had covid)      refuses flu refuses  pneumonia\par \par Recommendations\par 1- ventolin   MDI  teaching before exercise\par 2- cxr pa/lateral\par 3- sleep study\par 4- d dimer   ace level\par \par return after above\par \par patient agrees   \par \par

## 2022-09-15 NOTE — PHYSICAL EXAM
[III] : Mallampati Class: III [Supple] : supple [No Neck Mass] : no neck mass [No JVD] : no jvd [Normal S1, S2] : normal s1, s2 [No Murmurs] : no murmurs [Kyphosis] : kyphosis [Benign] : benign [No Masses] : no masses [Soft] : soft [Normal Bowel Sounds] : normal bowel sounds [Normal Gait] : normal gait [No Clubbing] : no clubbing [No Edema] : no edema [No Rash] : no rash [No Motor Deficits] : no motor deficits [Normal Affect] : normal affect [TextBox_2] : pleasant male no distress speaking full sentences no cough   [TextBox_11] : moist no lesions   long palate [TextBox_68] : bilateral air entry  prolonged expiratory phase     no w/r/r

## 2022-10-13 ENCOUNTER — OUTPATIENT (OUTPATIENT)
Dept: OUTPATIENT SERVICES | Facility: HOSPITAL | Age: 72
LOS: 1 days | End: 2022-10-13
Payer: MEDICARE

## 2022-10-13 ENCOUNTER — APPOINTMENT (OUTPATIENT)
Dept: RADIOLOGY | Facility: CLINIC | Age: 72
End: 2022-10-13

## 2022-10-13 DIAGNOSIS — Z00.8 ENCOUNTER FOR OTHER GENERAL EXAMINATION: ICD-10-CM

## 2022-10-13 PROCEDURE — 71046 X-RAY EXAM CHEST 2 VIEWS: CPT

## 2022-10-13 PROCEDURE — 71046 X-RAY EXAM CHEST 2 VIEWS: CPT | Mod: 26

## 2022-10-19 ENCOUNTER — APPOINTMENT (OUTPATIENT)
Dept: ENDOCRINOLOGY | Facility: CLINIC | Age: 72
End: 2022-10-19

## 2022-10-19 VITALS
BODY MASS INDEX: 26.66 KG/M2 | OXYGEN SATURATION: 96 % | DIASTOLIC BLOOD PRESSURE: 62 MMHG | SYSTOLIC BLOOD PRESSURE: 116 MMHG | HEART RATE: 76 BPM | WEIGHT: 180 LBS | HEIGHT: 69 IN

## 2022-10-19 LAB
GLUCOSE BLDC GLUCOMTR-MCNC: 111
HBA1C MFR BLD HPLC: 8.2

## 2022-10-19 PROCEDURE — 82962 GLUCOSE BLOOD TEST: CPT

## 2022-10-19 PROCEDURE — 83036 HEMOGLOBIN GLYCOSYLATED A1C: CPT | Mod: QW

## 2022-10-19 PROCEDURE — 99205 OFFICE O/P NEW HI 60 MIN: CPT | Mod: 25

## 2022-10-19 RX ORDER — METFORMIN HYDROCHLORIDE 500 MG/1
500 TABLET, COATED ORAL TWICE DAILY
Qty: 360 | Refills: 1 | Status: ACTIVE | COMMUNITY
Start: 2022-10-19 | End: 1900-01-01

## 2022-10-19 RX ORDER — METFORMIN HYDROCHLORIDE 625 MG/1
TABLET ORAL
Refills: 0 | Status: ACTIVE | COMMUNITY

## 2022-10-19 RX ORDER — METOPROLOL SUCCINATE 25 MG/1
25 TABLET, EXTENDED RELEASE ORAL
Qty: 180 | Refills: 0 | Status: ACTIVE | COMMUNITY
Start: 2022-09-23

## 2022-10-19 RX ORDER — TAMSULOSIN HYDROCHLORIDE 0.4 MG/1
0.4 CAPSULE ORAL
Refills: 0 | Status: ACTIVE | COMMUNITY

## 2022-10-20 ENCOUNTER — OUTPATIENT (OUTPATIENT)
Dept: OUTPATIENT SERVICES | Facility: HOSPITAL | Age: 72
LOS: 1 days | End: 2022-10-20
Payer: MEDICARE

## 2022-10-20 ENCOUNTER — APPOINTMENT (OUTPATIENT)
Dept: CT IMAGING | Facility: CLINIC | Age: 72
End: 2022-10-20

## 2022-10-20 ENCOUNTER — NON-APPOINTMENT (OUTPATIENT)
Age: 72
End: 2022-10-20

## 2022-10-20 DIAGNOSIS — R91.8 OTHER NONSPECIFIC ABNORMAL FINDING OF LUNG FIELD: ICD-10-CM

## 2022-10-20 PROCEDURE — 71250 CT THORAX DX C-: CPT | Mod: 26

## 2022-10-20 PROCEDURE — 71250 CT THORAX DX C-: CPT

## 2022-10-30 LAB
C PEPTIDE SERPL-MCNC: 2.3 NG/ML
T4 FREE SERPL-MCNC: 1 NG/DL
TSH SERPL-ACNC: 3.26 UIU/ML

## 2022-10-31 LAB
DEPRECATED D DIMER PPP IA-ACNC: <150 NG/ML DDU
PANC ISLET CELL AB SER QL: NORMAL

## 2022-11-08 LAB — ACE BLD-CCNC: 22 U/L

## 2022-11-10 ENCOUNTER — OUTPATIENT (OUTPATIENT)
Dept: OUTPATIENT SERVICES | Facility: HOSPITAL | Age: 72
LOS: 1 days | End: 2022-11-10
Payer: MEDICARE

## 2022-11-10 DIAGNOSIS — G47.33 OBSTRUCTIVE SLEEP APNEA (ADULT) (PEDIATRIC): ICD-10-CM

## 2022-11-10 PROCEDURE — 95810 POLYSOM 6/> YRS 4/> PARAM: CPT

## 2022-11-11 ENCOUNTER — NON-APPOINTMENT (OUTPATIENT)
Age: 72
End: 2022-11-11

## 2022-11-11 LAB — GAD65 AB SER-MCNC: 0 NMOL/L

## 2022-11-11 NOTE — ASSESSMENT
[Carbohydrate Consistent Diet] : carbohydrate consistent diet [Importance of Diet and Exercise] : importance of diet and exercise to improve glycemic control, achieve weight loss and improve cardiovascular health [Exercise/Effect on Glucose] : exercise/effect on glucose [FreeTextEntry1] : 72 y.o. Male with PMHx of DM, HLD, BPH and Arrhythmia, referred from PCP for uncontrolled DM. Patient was diagnosed with DM 6 months ago. His A1C was gradually increasing to 11.8% until couple of months ago. Patient was started on Metformin 3 weeks ago and he improved significantly his diet. He is a avid cyclist and has been very active, but recently slowed down his cycling performance due to spells of orthostatic symptoms and dizziness. Family Hx significant of DM on his father and brother at the same age. He reports nocturia but attributes this to BPH. \par \par Currently on:\par Metformin 500 mg BID\par \par wears Libre2 - unable to download report\par POC glucose today 111\par \par # Newly diagnosed DM\par Will check Ab for autoimmune diabetes\par A1C trending down after initiation of Metformin\par POC A1C today 8.2%\par Will slowly increase Metformin to 1000 mg BID because patient had GI symptoms.\par If not tolerating will consider ER Metformin or switching to other agent\par Discussed dietary and lifestyle modification\par Patient already seeing dietitian at his PCP office\par Continue current physical activities.\par Patient will obtain new blood work in 10 days. Will call him with results and possible Tx adjustment if needed. \par \par # Arrhythmia\par He has been complaining of lightheadedness, orthostatic symptoms. \par Upon my evaluation his HR is ~50b/m, seams sinus but with frequent irregular beats.\par He is on BB and have follow up with His cardiologist next month.\par Would recommend to contact him even earlier if symptoms persist.\par \par # HLD\par Continue statin\par Will follow lipid panel\par Needs low fat/cholesterol diet\par \par # BPH\par Continue Finasteride  and Tamsulosin\par Advised to keep good hydration\par \par RTC in 2-3 months. \par \par \par  \par \par

## 2022-11-11 NOTE — PHYSICAL EXAM
[Alert] : alert [Well Nourished] : well nourished [No Acute Distress] : no acute distress [Well Developed] : well developed [EOMI] : extra ocular movement intact [PERRL] : pupils equal, round and reactive to light [Normal Outer Ear/Nose] : the ears and nose were normal in appearance [Normal Hearing] : hearing was normal [Thyroid Not Enlarged] : the thyroid was not enlarged [No Thyroid Nodules] : no palpable thyroid nodules [No Respiratory Distress] : no respiratory distress [Clear to Auscultation] : lungs were clear to auscultation bilaterally [No Edema] : no peripheral edema [Normal Bowel Sounds] : normal bowel sounds [Not Tender] : non-tender [Soft] : abdomen soft [Normal Supraclavicular Nodes] : no supraclavicular lymphadenopathy [Normal Anterior Cervical Nodes] : no anterior cervical lymphadenopathy [Normal Posterior Cervical Nodes] : no posterior cervical lymphadenopathy [No Clubbing, Cyanosis] : no clubbing  or cyanosis of the fingernails [No Joint Swelling] : no joint swelling seen [No Rash] : no rash [No Skin Lesions] : no skin lesions [Normal Reflexes] : deep tendon reflexes were 2+ and symmetric [No Tremors] : no tremors [Oriented x3] : oriented to person, place, and time [Normal Affect] : the affect was normal [Normal Insight/Judgement] : insight and judgment were intact [Normal Mood] : the mood was normal [Abdominal Striae] : no abdominal striae [Acanthosis Nigricans] : no acanthosis nigricans [de-identified] : Bradycardia, Frequent irregular beats.

## 2022-11-11 NOTE — REVIEW OF SYSTEMS
[Slow Heart Rate] : slow heart rate [Diarrhea] : diarrhea [Gas/Bloating] : gas/bloating [Negative] : Heme/Lymph [FreeTextEntry5] : Irregular beats

## 2022-11-11 NOTE — HISTORY OF PRESENT ILLNESS
[FreeTextEntry1] : 72 y.o. Male with PMHx of DM, HLD, BPH and Arrhythmia, referred from PCP for uncontrolled DM. Patient was diagnosed with DM 6 months ago. His A1C was gradually increasing to 11.8% until couple of months ago. Patient was started on Metformin 3 weeks ago and he improved significantly his diet. He is a avid cyclist and has been very active, but recently slowed down his cycling performance due to spells of orthostatic symptoms and dizziness. Family Hx significant of DM on his father and brother at the same age. He reports nocturia but attributes this to BPH.

## 2022-11-14 ENCOUNTER — APPOINTMENT (OUTPATIENT)
Dept: PULMONOLOGY | Facility: CLINIC | Age: 72
End: 2022-11-14

## 2022-11-14 VITALS
DIASTOLIC BLOOD PRESSURE: 70 MMHG | SYSTOLIC BLOOD PRESSURE: 118 MMHG | OXYGEN SATURATION: 96 % | HEART RATE: 68 BPM | RESPIRATION RATE: 16 BRPM | WEIGHT: 170 LBS | BODY MASS INDEX: 25.18 KG/M2 | HEIGHT: 69 IN

## 2022-11-14 DIAGNOSIS — R91.8 OTHER NONSPECIFIC ABNORMAL FINDING OF LUNG FIELD: ICD-10-CM

## 2022-11-14 DIAGNOSIS — N28.9 DISORDER OF KIDNEY AND URETER, UNSPECIFIED: ICD-10-CM

## 2022-11-14 DIAGNOSIS — R06.83 SNORING: ICD-10-CM

## 2022-11-14 PROCEDURE — 99214 OFFICE O/P EST MOD 30 MIN: CPT

## 2022-11-14 RX ORDER — METOPROLOL TARTRATE 75 MG/1
TABLET, FILM COATED ORAL
Refills: 0 | Status: DISCONTINUED | COMMUNITY
End: 2022-11-14

## 2022-11-14 NOTE — REVIEW OF SYSTEMS
[Fatigue] : fatigue [Recent Wt Loss (___ Lbs)] : ~T recent [unfilled] lb weight loss [SOB on Exertion] : sob on exertion [Seasonal Allergies] : seasonal allergies [Back Pain] : back pain [Diabetes] : diabetes [Fever] : no fever [Recent Wt Gain (___ Lbs)] : ~T no recent weight gain [Chills] : no chills [Cough] : no cough [Hemoptysis] : no hemoptysis [Chest Tightness] : no chest tightness [Sputum] : no sputum [Dyspnea] : no dyspnea [Pleuritic Pain] : no pleuritic pain [Wheezing] : no wheezing [GERD] : no gerd [Abdominal Pain] : no abdominal pain [Nausea] : no nausea [Vomiting] : no vomiting [Dysphagia] : no dysphagia [Bleeding] : no bleeding [Myalgias] : no myalgias [Chronic Pain] : no chronic pain [Rash] : no rash [Blood Transfusion] : no blood transfusion [Clotting Disorder/ Frequent bleeding] : no clotting disorder/ frequent bleeding [Depression] : no depression [Anxiety] : no anxiety [Panic Attacks] : no panic attacks [Obesity] : no obesity [TextBox_3] : recent dx DM       weight lose  program  [TextBox_30] : one  flight     [facility]  [TextBox_134] : only planes    [TextBox_141] : new  DM  dx

## 2022-11-14 NOTE — ASSESSMENT
[FreeTextEntry1] : 73y/o male   never smoker  retried  \par \par 1-   sleep disordered breathing    ? joseph    snoring\par 2-   PVC  per cardiology   ? etiology\par 3- DUMONT  /  small airway obstruction /  restrictive defect  from kyphosis \par 4- ct 10/22   3 mm RUL nodule   kidney lesion?\par 5-  vaccinations   covid +   (  had covid)      refuses flu refuses  pneumonia\par \par Recommendations\par 1- K[facility]   MDI  teaching before exercise\par 2- exercise and posture discussed \par 3- sleep study results \par 4- nephrology    for kidney\par \par \par return in 2 months\par \par patient agrees   \par \par

## 2022-11-14 NOTE — REASON FOR VISIT
[Follow-Up] : a follow-up visit [Abnormal CXR/ Chest CT] : an abnormal CXR/ chest CT [Sleep Evaluation] : sleep evaluation

## 2022-11-14 NOTE — HISTORY OF PRESENT ILLNESS
[Never] : never [Daytime Somnolence] : daytime somnolence [Difficulty Maintaining Sleep] : difficulty maintaining sleep [Fatigue] : fatigue [Snoring] : snoring [TextBox_4] : 71y/o   male  born in [place]   never smoker  occupational -     (  did not wear mask in past 16 years)  one dog\par - cardiology  followed   for arrhythmia  PVC     here for  evaluation \par \par -no h/o   pneumonia no asthma\par - ride bicycle       miles walk   no issues \par \par - 9/2021  hernia repair  with no issues \par h/o  covid  2year  ago  then  two  months  ago  + covid  sore throat   ( [facility]) \par \par 11/14/2022\par 71y/o   male  born in  [place]  never smoker   for sleep follow up ct\par - cxr reviewed\par -ct reviewed\par - had sleep study no report yet\par - no chest pain no sob  enjoys cold weather  [Awakes with Dry Mouth] : does not awaken with dry mouth [Awakes with Headache] : does not awaken with headache [Difficulty Initiating Sleep] : does not have difficulty initiating sleep [TextBox_77] : 10 [TextBox_79] : 6 [TextBox_81] : 5 min  [TextBox_83] : 1 [ESS] : 5

## 2022-11-14 NOTE — PHYSICAL EXAM
[Normal Oropharynx] : normal oropharynx [IV] : Mallampati Class: IV [Supple] : supple [No Neck Mass] : no neck mass [Normal S1, S2] : normal s1, s2 [Clear to Auscultation Bilaterally] : clear to auscultation bilaterally [Kyphosis] : kyphosis [Benign] : benign [No Masses] : no masses [Soft] : soft [No Hernias] : no hernias [Normal Bowel Sounds] : normal bowel sounds [Normal Gait] : normal gait [No Clubbing] : no clubbing [No Edema] : no edema [No Rash] : no rash [No Motor Deficits] : no motor deficits [Normal Affect] : normal affect [TextBox_2] : pleasant male  no distress speaking full sentences no cough

## 2022-11-29 ENCOUNTER — NON-APPOINTMENT (OUTPATIENT)
Age: 72
End: 2022-11-29

## 2022-11-29 DIAGNOSIS — G47.61 PERIODIC LIMB MOVEMENT DISORDER: ICD-10-CM

## 2023-02-01 ENCOUNTER — APPOINTMENT (OUTPATIENT)
Dept: ENDOCRINOLOGY | Facility: CLINIC | Age: 73
End: 2023-02-01
Payer: MEDICARE

## 2023-02-01 VITALS
WEIGHT: 181 LBS | DIASTOLIC BLOOD PRESSURE: 86 MMHG | HEIGHT: 69 IN | BODY MASS INDEX: 26.81 KG/M2 | SYSTOLIC BLOOD PRESSURE: 132 MMHG

## 2023-02-01 DIAGNOSIS — E10.628 TYPE 1 DIABETES MELLITUS WITH OTHER SKIN COMPLICATIONS: ICD-10-CM

## 2023-02-01 LAB — GLUCOSE BLDC GLUCOMTR-MCNC: 179

## 2023-02-01 PROCEDURE — 99214 OFFICE O/P EST MOD 30 MIN: CPT | Mod: 25

## 2023-02-01 PROCEDURE — 82962 GLUCOSE BLOOD TEST: CPT

## 2023-02-02 LAB
ALBUMIN SERPL ELPH-MCNC: 4.6 G/DL
ALP BLD-CCNC: 76 U/L
ALT SERPL-CCNC: 26 U/L
ANION GAP SERPL CALC-SCNC: 12 MMOL/L
AST SERPL-CCNC: 29 U/L
BASOPHILS # BLD AUTO: 0.04 K/UL
BASOPHILS NFR BLD AUTO: 0.7 %
BILIRUB SERPL-MCNC: 0.5 MG/DL
BUN SERPL-MCNC: 18 MG/DL
CALCIUM SERPL-MCNC: 10.3 MG/DL
CHLORIDE SERPL-SCNC: 104 MMOL/L
CHOLEST SERPL-MCNC: 156 MG/DL
CO2 SERPL-SCNC: 27 MMOL/L
CREAT SERPL-MCNC: 0.77 MG/DL
CREAT SPEC-SCNC: 79 MG/DL
EGFR: 94 ML/MIN/1.73M2
EOSINOPHIL # BLD AUTO: 0.15 K/UL
EOSINOPHIL NFR BLD AUTO: 2.7 %
ESTIMATED AVERAGE GLUCOSE: 151 MG/DL
FRUCTOSAMINE SERPL-MCNC: 303 UMOL/L
GLUCOSE SERPL-MCNC: 153 MG/DL
HBA1C MFR BLD HPLC: 6.9 %
HCT VFR BLD CALC: 44.1 %
HDLC SERPL-MCNC: 62 MG/DL
HGB BLD-MCNC: 14.6 G/DL
IMM GRANULOCYTES NFR BLD AUTO: 0.4 %
LDLC SERPL CALC-MCNC: 75 MG/DL
LYMPHOCYTES # BLD AUTO: 2.08 K/UL
LYMPHOCYTES NFR BLD AUTO: 37.1 %
MAN DIFF?: NORMAL
MCHC RBC-ENTMCNC: 31.5 PG
MCHC RBC-ENTMCNC: 33.1 GM/DL
MCV RBC AUTO: 95 FL
MICROALBUMIN 24H UR DL<=1MG/L-MCNC: <1.2 MG/DL
MICROALBUMIN/CREAT 24H UR-RTO: NORMAL MG/G
MONOCYTES # BLD AUTO: 0.55 K/UL
MONOCYTES NFR BLD AUTO: 9.8 %
NEUTROPHILS # BLD AUTO: 2.77 K/UL
NEUTROPHILS NFR BLD AUTO: 49.3 %
NONHDLC SERPL-MCNC: 94 MG/DL
PLATELET # BLD AUTO: 289 K/UL
POTASSIUM SERPL-SCNC: 4.8 MMOL/L
PROT SERPL-MCNC: 6.6 G/DL
RBC # BLD: 4.64 M/UL
RBC # FLD: 11.9 %
SODIUM SERPL-SCNC: 143 MMOL/L
TRIGL SERPL-MCNC: 99 MG/DL
WBC # FLD AUTO: 5.61 K/UL

## 2023-02-02 NOTE — ASSESSMENT
[FreeTextEntry1] : 72 y.o. Male with PMHx of DM, HLD, BPH and Arrhythmia, follows up for DM management. 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   \par \par Currently on:\par Metformin 500 ER mg BID (regular MF gives him GI symptoms)\par \par Has Libre2 but don’t use it\par POC glucose today 179.\par \par SMBG: Fasting 130 - 160, During the day 140 - 190 \par \par # DM II (Dx 4/2022)\par Ab negative for autoimmune diabetes\par No new labs. Last A1C 8.2% (10/2022). Patient will do labs tomorrow. Will call with results\par ADDENDUM: repeat A1C 6.9% with adequate Fructosamine 303.\par Continue current regimen.\par Seeing dietitian at his PCP office\par \par # Arrhythmia - controlled with BB\par No more lightheadedness\par F/u with cardiology\par \par # HLD\par Continue statin\par Follow lipid panel\par \par # BPH\par Continue Finasteride  and Tamsulosin\par Advised to keep good hydration\par \par RTC in 3 months. \par \par \par  \par \par

## 2023-02-02 NOTE — PHYSICAL EXAM
[Alert] : alert [Well Nourished] : well nourished [No Acute Distress] : no acute distress [Well Developed] : well developed [EOMI] : extra ocular movement intact [PERRL] : pupils equal, round and reactive to light [Normal Outer Ear/Nose] : the ears and nose were normal in appearance [Normal Hearing] : hearing was normal [Thyroid Not Enlarged] : the thyroid was not enlarged [No Thyroid Nodules] : no palpable thyroid nodules [No Respiratory Distress] : no respiratory distress [Clear to Auscultation] : lungs were clear to auscultation bilaterally [No Edema] : no peripheral edema [Normal Bowel Sounds] : normal bowel sounds [Not Tender] : non-tender [Soft] : abdomen soft [Normal Supraclavicular Nodes] : no supraclavicular lymphadenopathy [Normal Anterior Cervical Nodes] : no anterior cervical lymphadenopathy [No Clubbing, Cyanosis] : no clubbing  or cyanosis of the fingernails [No Joint Swelling] : no joint swelling seen [No Rash] : no rash [No Skin Lesions] : no skin lesions [Normal Reflexes] : deep tendon reflexes were 2+ and symmetric [No Tremors] : no tremors [Oriented x3] : oriented to person, place, and time [Normal Affect] : the affect was normal [Normal Insight/Judgement] : insight and judgment were intact [Normal Mood] : the mood was normal [Normal Rate] : heart rate was normal [Regular Rhythm] : with a regular rhythm [Abdominal Striae] : no abdominal striae [Acanthosis Nigricans] : no acanthosis nigricans

## 2023-03-08 ENCOUNTER — APPOINTMENT (OUTPATIENT)
Dept: ENDOCRINOLOGY | Facility: CLINIC | Age: 73
End: 2023-03-08

## 2023-05-10 ENCOUNTER — NON-APPOINTMENT (OUTPATIENT)
Age: 73
End: 2023-05-10

## 2023-05-10 LAB
BASOPHILS # BLD AUTO: 0.04 K/UL
BASOPHILS NFR BLD AUTO: 0.7 %
EOSINOPHIL # BLD AUTO: 0.21 K/UL
EOSINOPHIL NFR BLD AUTO: 3.4 %
HCT VFR BLD CALC: 41.9 %
HGB BLD-MCNC: 14 G/DL
IMM GRANULOCYTES NFR BLD AUTO: 0.2 %
LYMPHOCYTES # BLD AUTO: 2.18 K/UL
LYMPHOCYTES NFR BLD AUTO: 35.7 %
MAN DIFF?: NORMAL
MCHC RBC-ENTMCNC: 30.6 PG
MCHC RBC-ENTMCNC: 33.4 GM/DL
MCV RBC AUTO: 91.5 FL
MONOCYTES # BLD AUTO: 0.6 K/UL
MONOCYTES NFR BLD AUTO: 9.8 %
NEUTROPHILS # BLD AUTO: 3.07 K/UL
NEUTROPHILS NFR BLD AUTO: 50.2 %
PLATELET # BLD AUTO: 287 K/UL
RBC # BLD: 4.58 M/UL
RBC # FLD: 12.5 %
WBC # FLD AUTO: 6.11 K/UL

## 2023-05-12 LAB
ALBUMIN SERPL ELPH-MCNC: 4.8 G/DL
ALP BLD-CCNC: 67 U/L
ALT SERPL-CCNC: 23 U/L
ANION GAP SERPL CALC-SCNC: 13 MMOL/L
AST SERPL-CCNC: 22 U/L
BILIRUB SERPL-MCNC: 0.7 MG/DL
BUN SERPL-MCNC: 21 MG/DL
CALCIUM SERPL-MCNC: 10.1 MG/DL
CHLORIDE SERPL-SCNC: 102 MMOL/L
CHOLEST SERPL-MCNC: 165 MG/DL
CO2 SERPL-SCNC: 26 MMOL/L
CREAT SERPL-MCNC: 0.88 MG/DL
CREAT SPEC-SCNC: 167 MG/DL
EGFR: 90 ML/MIN/1.73M2
ESTIMATED AVERAGE GLUCOSE: 183 MG/DL
FRUCTOSAMINE SERPL-MCNC: 345 UMOL/L
GLUCOSE SERPL-MCNC: 221 MG/DL
HBA1C MFR BLD HPLC: 8 %
HDLC SERPL-MCNC: 57 MG/DL
LDLC SERPL CALC-MCNC: 92 MG/DL
MICROALBUMIN 24H UR DL<=1MG/L-MCNC: 2.4 MG/DL
MICROALBUMIN/CREAT 24H UR-RTO: 14 MG/G
NONHDLC SERPL-MCNC: 108 MG/DL
POTASSIUM SERPL-SCNC: 4.8 MMOL/L
PROT SERPL-MCNC: 6.6 G/DL
SODIUM SERPL-SCNC: 140 MMOL/L
TRIGL SERPL-MCNC: 80 MG/DL

## 2023-05-17 ENCOUNTER — APPOINTMENT (OUTPATIENT)
Dept: ENDOCRINOLOGY | Facility: CLINIC | Age: 73
End: 2023-05-17
Payer: MEDICARE

## 2023-05-17 VITALS
SYSTOLIC BLOOD PRESSURE: 121 MMHG | BODY MASS INDEX: 26.66 KG/M2 | OXYGEN SATURATION: 98 % | WEIGHT: 180 LBS | DIASTOLIC BLOOD PRESSURE: 80 MMHG | HEIGHT: 69 IN | HEART RATE: 66 BPM

## 2023-05-17 LAB — GLUCOSE BLDC GLUCOMTR-MCNC: 187

## 2023-05-17 PROCEDURE — 99215 OFFICE O/P EST HI 40 MIN: CPT | Mod: 25

## 2023-05-17 PROCEDURE — 82962 GLUCOSE BLOOD TEST: CPT

## 2023-05-17 NOTE — ASSESSMENT
[FreeTextEntry1] : 72 y.o. Male with PMHx of DM, HLD, BPH and Arrhythmia, follows up for DM management. 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   \par \par Currently on:\par Metformin 500 ER mg BID (regular MF gives him GI symptoms)\par \par Has Libre2 but don’t use it\par \par SMBG - fasting 160 - 170\par POC today 187\par \par \par # DM II (Dx 4/2022)\par autoimmune diabetes ruled out.\par A1C 8%<==6.9% Patient admits dietary indiscretions for the last couple of months. \par We had a long dietary discussion. I advised him on low carb diet with cutting snaking.\par Continue current regimen.\par Seeing dietitian at his PCP office\par \par # Arrhythmia\par Controlled with BB\par F/u with cardiology\par \par # HLD\par Continue statin\par \par # BPH\par Continue Finasteride  and Tamsulosin\par Advised to keep good hydration\par \par RTC in 4 months. \par \par \par  \par \par

## 2023-05-17 NOTE — PHYSICAL EXAM
[Alert] : alert [Well Nourished] : well nourished [No Acute Distress] : no acute distress [Well Developed] : well developed [EOMI] : extra ocular movement intact [PERRL] : pupils equal, round and reactive to light [Normal Outer Ear/Nose] : the ears and nose were normal in appearance [Normal Hearing] : hearing was normal [Thyroid Not Enlarged] : the thyroid was not enlarged [No Thyroid Nodules] : no palpable thyroid nodules [No Respiratory Distress] : no respiratory distress [Clear to Auscultation] : lungs were clear to auscultation bilaterally [Normal Rate] : heart rate was normal [Regular Rhythm] : with a regular rhythm [No Edema] : no peripheral edema [Normal Bowel Sounds] : normal bowel sounds [Not Tender] : non-tender [Soft] : abdomen soft [Normal Supraclavicular Nodes] : no supraclavicular lymphadenopathy [Normal Anterior Cervical Nodes] : no anterior cervical lymphadenopathy [No Clubbing, Cyanosis] : no clubbing  or cyanosis of the fingernails [No Joint Swelling] : no joint swelling seen [No Rash] : no rash [No Skin Lesions] : no skin lesions [Normal Reflexes] : deep tendon reflexes were 2+ and symmetric [No Tremors] : no tremors [Oriented x3] : oriented to person, place, and time [Normal Affect] : the affect was normal [Normal Insight/Judgement] : insight and judgment were intact [Normal Mood] : the mood was normal [Abdominal Striae] : no abdominal striae [Acanthosis Nigricans] : no acanthosis nigricans

## 2023-09-11 NOTE — ED ADULT NURSE NOTE - NS ED NURSE TRANSPORT WITH
Continue taking Plavix daily until Tuesday 5/3, then stop    Begin Xarelto on Wednesday 5/4 as prescribed    Remove right groin dressing on Tuesday 5/3  
Patient ID: Ena Gonzalez is a 74 year old female.     Chief complaint: had concerns including Office Visit, Sore Throat (X7 days ), and Wheezing.    HPI   sore throat,dry cough x 6 days  OTC med not heliping  No sick contacts  No sinus pressure and nasal congestion, HA, dizziness,  cough, CP, SOB, diarrhea, fever     Home Medications    Medication Directions Start Date End Date   amLODIPine (NORVASC) 2.5 MG tablet Take 1 tablet by mouth daily. 5/15/23    atorvastatin (LIPITOR) 20 MG tablet One tablet daily 5/15/23    furosemide (LASIX) 20 MG tablet Take 1 tablet by mouth daily. 5/15/23    levothyroxine 112 MCG tablet Take 1 tablet by mouth daily. 5/15/23    lisinopril-hydroCHLOROthiazide (ZESTORETIC) 20-12.5 MG per tablet Take 2 tablets by mouth daily. 5/15/23    meclizine (ANTIVERT) 12.5 MG tablet Take 1 tablet by mouth 3 times daily as needed for Dizziness. 5/25/22    pantoprazole (PROTONIX) 40 MG tablet Take 1 tablet by mouth daily. 8/13/21      ALLERGIES:  Penicillins and Sulfa antibiotics    SOCIAL HISTORY:  nonsmoker, minimal caffeine and alcohol, regular exercise, no drug use    Review of Systems    The patient denies visual disturbance, CP, SOB, AP, nausea, vomiting, diarrhea, blood in the urine or stool, dysuria, chronic fatigue, weight loss or gain, fevers, chills, night sweats.    Patient's past medical, surgical, social and family histories were reviewed and updated as appropriate.    Vitals:  Blood pressure (!) 143/72, pulse 68, temperature 98.1 °F (36.7 °C), temperature source Oral, resp. rate 18, height 5' 3\" (1.6 m), weight 98.2 kg (216 lb 9.6 oz), SpO2 97 %.    Physical Exam  GENERAL: well nourished, well developed, in no acute distress  HEENT:NC/AT, anicteric, EOMI, external ear and ear canals WNL, TMs intact without inflammation, fluid, or perforation, no nasal deformity, teeth and o/p red, no pus  NECK: supple, full ROM, no thyromegaly or adenopathy   LUNGS: clear to auscultation, no wheezes, 
rhonchi, rales  CV: RRR, no murmur, no S3, no S4  LYMPH NODES: no significant LAD    ASSESSMENT & PLAN  PHARYNGITIS  You can take Mucinex to thin your secretions. You can use Afrin nasal spray for 3 or 4 days to open the sinuses. Drink plenty of fluids and rest. Call if symptoms worsen or do not get better over the next week. Antibiotics as directed. Side effects of antibiotics described and probiotics encouraged. Hold atorvastatin while on levaquin    Follow up:  If symptoms do not improve in 1-2 weeks  Discussed medication dosage, usage, goals of therapy, and side effects.  The patient indicates understanding of these issues and agrees with the plan.    Henry Bowie MD    Note to patient: The 21st Century Cures Act makes medical notes like these available to patients in the interest of transparency. However, be advised this is a medical document. It is intended as peer to peer communication. It is written in medical language and may contain abbreviations or verbiage that are unfamiliar. It may appear blunt or direct. Medical documents are intended to carry relevant information, facts as evident, and the clinical opinion of the practitioner.  
IV/cardiac monitor

## 2023-09-27 ENCOUNTER — LABORATORY RESULT (OUTPATIENT)
Age: 73
End: 2023-09-27

## 2023-10-05 ENCOUNTER — APPOINTMENT (OUTPATIENT)
Dept: ENDOCRINOLOGY | Facility: CLINIC | Age: 73
End: 2023-10-05
Payer: MEDICARE

## 2023-10-05 VITALS
BODY MASS INDEX: 26.36 KG/M2 | SYSTOLIC BLOOD PRESSURE: 110 MMHG | HEART RATE: 76 BPM | OXYGEN SATURATION: 96 % | HEIGHT: 69 IN | DIASTOLIC BLOOD PRESSURE: 78 MMHG | WEIGHT: 178 LBS

## 2023-10-05 LAB — GLUCOSE BLDC GLUCOMTR-MCNC: 223

## 2023-10-05 PROCEDURE — 99215 OFFICE O/P EST HI 40 MIN: CPT | Mod: 25

## 2023-10-05 PROCEDURE — 82962 GLUCOSE BLOOD TEST: CPT

## 2024-01-06 ENCOUNTER — LABORATORY RESULT (OUTPATIENT)
Age: 74
End: 2024-01-06

## 2024-01-08 ENCOUNTER — NON-APPOINTMENT (OUTPATIENT)
Age: 74
End: 2024-01-08

## 2024-01-10 ENCOUNTER — APPOINTMENT (OUTPATIENT)
Dept: ENDOCRINOLOGY | Facility: CLINIC | Age: 74
End: 2024-01-10
Payer: MEDICARE

## 2024-01-10 VITALS
WEIGHT: 184 LBS | HEIGHT: 69 IN | BODY MASS INDEX: 27.25 KG/M2 | DIASTOLIC BLOOD PRESSURE: 70 MMHG | SYSTOLIC BLOOD PRESSURE: 110 MMHG | HEART RATE: 79 BPM | OXYGEN SATURATION: 96 %

## 2024-01-10 LAB — GLUCOSE BLDC GLUCOMTR-MCNC: 136

## 2024-01-10 PROCEDURE — 99214 OFFICE O/P EST MOD 30 MIN: CPT | Mod: 25

## 2024-01-10 PROCEDURE — 82962 GLUCOSE BLOOD TEST: CPT

## 2024-01-10 RX ORDER — METFORMIN ER 500 MG 500 MG/1
500 TABLET ORAL
Qty: 180 | Refills: 1 | Status: ACTIVE | COMMUNITY
Start: 2022-11-11 | End: 1900-01-01

## 2024-01-10 NOTE — HISTORY OF PRESENT ILLNESS
[FreeTextEntry1] : 73 y.o. Male with PMHx of DM, HLD, BPH and Arrhythmia, follows up for DM management. 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

## 2024-01-31 ENCOUNTER — OFFICE (OUTPATIENT)
Dept: URBAN - METROPOLITAN AREA CLINIC 113 | Facility: CLINIC | Age: 74
Setting detail: OPHTHALMOLOGY
End: 2024-01-31
Payer: MEDICARE

## 2024-01-31 DIAGNOSIS — E11.9: ICD-10-CM

## 2024-01-31 DIAGNOSIS — H25.13: ICD-10-CM

## 2024-01-31 PROCEDURE — 92250 FUNDUS PHOTOGRAPHY W/I&R: CPT | Performed by: STUDENT IN AN ORGANIZED HEALTH CARE EDUCATION/TRAINING PROGRAM

## 2024-01-31 PROCEDURE — 92004 COMPRE OPH EXAM NEW PT 1/>: CPT | Performed by: STUDENT IN AN ORGANIZED HEALTH CARE EDUCATION/TRAINING PROGRAM

## 2024-01-31 ASSESSMENT — CONFRONTATIONAL VISUAL FIELD TEST (CVF)
OD_FINDINGS: FULL
OS_FINDINGS: FULL

## 2024-01-31 ASSESSMENT — REFRACTION_CURRENTRX
OS_OVR_VA: 20/
OD_OVR_VA: 20/
OS_SPHERE: +2.75
OD_SPHERE: +2.75
OS_VPRISM_DIRECTION: SV
OD_VPRISM_DIRECTION: SV

## 2024-04-04 LAB
ALBUMIN SERPL ELPH-MCNC: 4.4 G/DL
ALP BLD-CCNC: 91 U/L
ALT SERPL-CCNC: 22 U/L
ANION GAP SERPL CALC-SCNC: 16 MMOL/L
AST SERPL-CCNC: 17 U/L
BASOPHILS # BLD AUTO: 0.05 K/UL
BASOPHILS NFR BLD AUTO: 0.9 %
BILIRUB SERPL-MCNC: 0.3 MG/DL
BUN SERPL-MCNC: 14 MG/DL
CALCIUM SERPL-MCNC: 10 MG/DL
CHLORIDE SERPL-SCNC: 100 MMOL/L
CHOLEST SERPL-MCNC: 193 MG/DL
CO2 SERPL-SCNC: 26 MMOL/L
CREAT SERPL-MCNC: 0.8 MG/DL
CREAT SPEC-SCNC: 125 MG/DL
EGFR: 92 ML/MIN/1.73M2
EOSINOPHIL # BLD AUTO: 0.2 K/UL
EOSINOPHIL NFR BLD AUTO: 3.5 %
ESTIMATED AVERAGE GLUCOSE: 206 MG/DL
GLUCOSE SERPL-MCNC: 256 MG/DL
HBA1C MFR BLD HPLC: 8.8 %
HCT VFR BLD CALC: 40.9 %
HDLC SERPL-MCNC: 53 MG/DL
HGB BLD-MCNC: 14 G/DL
IMM GRANULOCYTES NFR BLD AUTO: 0.4 %
LDLC SERPL CALC-MCNC: 106 MG/DL
LYMPHOCYTES # BLD AUTO: 2.16 K/UL
LYMPHOCYTES NFR BLD AUTO: 38 %
MAN DIFF?: NORMAL
MCHC RBC-ENTMCNC: 30.6 PG
MCHC RBC-ENTMCNC: 34.2 GM/DL
MCV RBC AUTO: 89.5 FL
MICROALBUMIN 24H UR DL<=1MG/L-MCNC: 2.4 MG/DL
MICROALBUMIN/CREAT 24H UR-RTO: 19 MG/G
MONOCYTES # BLD AUTO: 0.61 K/UL
MONOCYTES NFR BLD AUTO: 10.7 %
NEUTROPHILS # BLD AUTO: 2.65 K/UL
NEUTROPHILS NFR BLD AUTO: 46.5 %
NONHDLC SERPL-MCNC: 140 MG/DL
PLATELET # BLD AUTO: 285 K/UL
POTASSIUM SERPL-SCNC: 4.9 MMOL/L
PROT SERPL-MCNC: 6.5 G/DL
RBC # BLD: 4.57 M/UL
RBC # FLD: 11.9 %
SODIUM SERPL-SCNC: 141 MMOL/L
TRIGL SERPL-MCNC: 198 MG/DL
WBC # FLD AUTO: 5.69 K/UL

## 2024-04-09 ENCOUNTER — APPOINTMENT (OUTPATIENT)
Dept: ENDOCRINOLOGY | Facility: CLINIC | Age: 74
End: 2024-04-09
Payer: MEDICARE

## 2024-04-09 VITALS
HEIGHT: 69 IN | HEART RATE: 76 BPM | OXYGEN SATURATION: 96 % | SYSTOLIC BLOOD PRESSURE: 110 MMHG | WEIGHT: 187 LBS | DIASTOLIC BLOOD PRESSURE: 78 MMHG | BODY MASS INDEX: 27.7 KG/M2

## 2024-04-09 DIAGNOSIS — E78.5 HYPERLIPIDEMIA, UNSPECIFIED: ICD-10-CM

## 2024-04-09 DIAGNOSIS — E11.9 TYPE 2 DIABETES MELLITUS W/OUT COMPLICATIONS: ICD-10-CM

## 2024-04-09 LAB — GLUCOSE BLDC GLUCOMTR-MCNC: 175

## 2024-04-09 PROCEDURE — 82962 GLUCOSE BLOOD TEST: CPT

## 2024-04-09 PROCEDURE — 99214 OFFICE O/P EST MOD 30 MIN: CPT

## 2024-04-09 PROCEDURE — G2211 COMPLEX E/M VISIT ADD ON: CPT

## 2024-04-09 RX ORDER — FLASH GLUCOSE SENSOR
KIT MISCELLANEOUS
Qty: 6 | Refills: 1 | Status: ACTIVE | COMMUNITY
Start: 2024-04-09 | End: 1900-01-01

## 2024-04-09 NOTE — PHYSICAL EXAM
[Alert] : alert [Well Nourished] : well nourished [No Acute Distress] : no acute distress [Well Developed] : well developed [EOMI] : extra ocular movement intact [PERRL] : pupils equal, round and reactive to light [Normal Outer Ear/Nose] : the ears and nose were normal in appearance [Normal Hearing] : hearing was normal [No Respiratory Distress] : no respiratory distress [Clear to Auscultation] : lungs were clear to auscultation bilaterally [Normal Rate] : heart rate was normal [Regular Rhythm] : with a regular rhythm [No Edema] : no peripheral edema [Normal Bowel Sounds] : normal bowel sounds [Soft] : abdomen soft [No Clubbing, Cyanosis] : no clubbing  or cyanosis of the fingernails [No Tremors] : no tremors [Oriented x3] : oriented to person, place, and time [Normal Affect] : the affect was normal [Normal Insight/Judgement] : insight and judgment were intact [Normal Mood] : the mood was normal [Acanthosis Nigricans] : no acanthosis nigricans

## 2024-04-09 NOTE — ASSESSMENT
[FreeTextEntry1] : 74 y.o. Male with PMHx of DM, HLD, BPH and Arrhythmia, follows up for DM management. 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 Autoimmune diabetes was ruled out.   Currently on Metformin 500 ER mg BID (regular MF gives him GI symptoms)  Has Libre2 in the past. Now requesting sensor refil.  POC today 175   # Moderately controlled T2D (Dx 4/2022) A1C worsen 8.8%<==7.5%<==8%<==8%<==6.9% Patient admits, not the best diet, eating cookies etc.  Advised to increase Metformin but he wants to start working on diet and more physical activity and if now improvement then to consider changing medical regimen.  Continue current regimen as of now. Start Libre2 - will refill (pays out of pocket) Discussed dietary and physical activates which patient is well aware of.   # HLD Continue statin  # Arrhythmia Controlled with BB F/u with cardiology  Due to my late availability, I recommend being seen in 3 months by NP and follow up with me in 6 months. Patient would prefer to repeat BW in 3 months and then we can decide further appointments based on results.  Tentative follow up with me in 6 months. (BW ordered)

## 2024-04-09 NOTE — HISTORY OF PRESENT ILLNESS
[FreeTextEntry1] : 74 y.o. Male with PMHx of DM, HLD, BPH and Arrhythmia, follows up for DM management. 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

## 2024-09-04 NOTE — PATIENT PROFILE ADULT. - TEACHING/LEARNING FACTORS IMPACT ABILITY TO LEARN
[facility] at [facility]  Office visit  Buddy Albrecht  1948  [phone]  There is no work phone number on file.    VISIT DATE:  9/4/2024    PCP: Dung De Guzman MD  [address]  [facility] [address]    CC:  Chief Complaint   Patient presents with    Coronary Artery Disease    Shortness of Breath       Previous cardiac studies and procedures:  May 2016 cardiac catheterization  Left Main  diffuse mild 30% narrowing     Left Anterior Descending  40% ostial with 20% mid luminal   irregularities     Left Circumflex  60% first marginal branch stenosis     Right 70% ostial and 80% ostial right posterolateral branch     PCI of ostial and right posterior lateral branches of RCA with   drug-eluting stents ×2      March 2017  Cardiac catheterization  Left Main  95% distal left main stem with complex involvement of   ostial LAD and ostial LCx     Left Anterior Descending  90% ostial stenosis with diffuse 30%   luminal irregularities     Left Circumflex  95% ostial stenosis with 70% proximal OM1   stenosis   Righ  large dominant vessel with 30-40% proximal disease widely   patent right posterior lateral stent with 40% proximal stenosis   and 80% in-stent stenosis in previously placed right PDA stent        Echo  - EF 60% to 65%. grade 1 diastolic   - Aortic valve: There was moderate stenosis. There was mild     regurgitation. Valve area (VTI): 1.25cm^2.   - Left atrium: The atrium was moderately dilated.      LIMA to the LAD, SVG sequenced to the PDA and posterolateral branch and radial graft to the obtuse marginal 1 coming off of the stroud of the venous graft to the PDA and the PLB. AVR-25mm Pardo Perimount        April 2019   TTE: EF 65%, aortic valve bioprosthesis.  Bilateral carotid duplex: Less than 50% atherosclerosis bilaterally    November 2021  PET myocardial perfusion imaging  REST EF = 58% STRESS EF = 62%.  Left ventricular ejection fraction is normal. .  Myocardial  perfusion imaging indicates a normal myocardial perfusion study with no evidence of ischemia.  Transthoracic echocardiogram  Left ventricular ejection fraction appears to be 56 - 60%. Left ventricular systolic function is normal.  Left ventricular diastolic function is consistent with (grade II w/high LAP) pseudonormalization.  Left ventricular wall thickness is consistent with mild concentric hypertrophy.  There is a bioprosthetic aortic valve present, normal function.  Estimated right ventricular systolic pressure from tricuspid regurgitation is normal (<35 mmHg).    ASSESSMENT:   Diagnosis Plan   1. Pure hypercholesterolemia        2. Primary hypertension        3. Nonrheumatic aortic valve stenosis        4. Coronary artery disease involving coronary bypass graft of native heart with angina pectoris        5. Aneurysm of ascending aorta without rupture  CT Angiogram Chest          PLAN:  Coronary disease: Currently stable and asymptomatic.  Reassuring perfusion imaging.  Continue aspirin, afterload reduction and statin.    Hyperlipidemia: Goal LDL less than 70.  Continue combination of Zetia and rosuvastatin.    Hypertension: Goal less than 130/80 mmHg.  Associated stage III chronic kidney disease.  Reasonable control, continue current medical therapy.  Continue keep home blood pressure log.    Ascending thoracic aortic aneurysm: 4.1-4.2 cm.  We will continue CT surveillance every 2 to 3 years.  Afterload control with goal blood pressure less than 130/80 mmHg.    Erectile dysfunction: Vasculogenic.    Heart failure with preserved ejection fraction: Currently appears euvolemic with adequately controlled afterload.  [place] heart class II-III.  Symptoms are likely multifactorial secondary to diastolic dysfunction with underlying obesity, deconditioning, and restrictive lung physiology due to central obesity limiting diaphragmatic excursion.  Continue current medical therapy.    Aortic stenosis status post  "bioprosthetic aortic valve replacement: Normal on echo, stable exam.  Surveillance echocardiography every 3 years.    Subjective  Interval assessment: Stable shortness of breath in a class II pattern.  Denies associated chest discomfort.  Blood pressures running less than 130/80 mmHg.  Document blood pressure lability during office visits.    Initial evaluation: 73-year-old gentleman with a history of moderate aortic stenosis and multivessel coronary disease status post CABG aVR at [facility].  He has underlying diabetes with gradually worsening control.  Underlying obesity with a BMI of 42.  He has had gradually progressive dyspnea on exertion over the past year.  He reports that he can walk 100 yards before needing to stop due to dyspnea.  Denies claudication.  No chest pressure or tightness.  Denies sustained palpitations.  Intermittent mild orthostasis.  Blood pressures at home running less than 130/80 mmHg.  He is compliant with medical therapy.  Chronic mild bilateral lower extremity edema, left greater than right.    PHYSICAL EXAMINATION:  Vitals:    09/04/24 1025   BP: 136/58   BP Location: Left arm   Patient Position: Sitting   Pulse: (!) 48   SpO2: 94%   Weight: 128 kg (282 lb 12.8 oz)   Height: 185.4 cm (73\")       General Appearance:    Alert, cooperative, no distress, appears stated age   Head:    Normocephalic, without obvious abnormality, atraumatic   Eyes:    conjunctiva/corneas clear   Nose:   Nares normal, septum midline, mucosa normal, no drainage   Throat:   Lips, teeth and gums normal   Neck:   Supple, symmetrical, trachea midline, no carotid    bruit or JVD   Lungs:     Clear to auscultation bilaterally, respirations unlabored   Chest Wall:    No tenderness or deformity    Heart:    Regular rate and rhythm, S1 and S2 normal, 3/6 early peaking systolic murmur right upper sternal border, no rub   or gallop, normal carotid impulse bilaterally without bruit.   Abdomen:     Soft, non-tender "   Extremities:   Extremities normal, atraumatic, no cyanosis or edema   Pulses:   2+ and symmetric all extremities   Skin:   Skin color, texture, turgor normal, no rashes or lesions       Diagnostic Data:  Procedures  Lab Results   Component Value Date    CHLPL 118 09/18/2023    TRIG 198 (H) 06/13/2024    HDL 35 (L) 06/13/2024     Lab Results   Component Value Date    GLUCOSE 112 (H) 06/13/2024    BUN 30 (H) 06/13/2024    CREATININE 1.30 (H) 06/13/2024     06/13/2024    K 5.1 06/13/2024     06/13/2024    CO2 22.7 06/13/2024     Lab Results   Component Value Date    HGBA1C 7.40 (H) 06/13/2024     Lab Results   Component Value Date    WBC 7.26 01/23/2024    HGB 12.6 (L) 01/23/2024    HCT 38.3 01/23/2024     01/23/2024       Allergies  No Known Allergies    Current Medications    Current Outpatient Medications:     aspirin 81 MG EC tablet, Take 1 tablet by mouth Daily., Disp: , Rfl:     empagliflozin (Jardiance) 10 MG tablet tablet, Take 1 tablet by mouth Daily., Disp: 90 tablet, Rfl: 3    ezetimibe (ZETIA) 10 MG tablet, Take 1 tablet by mouth Daily., Disp: 90 tablet, Rfl: 3    hydrocortisone 2.5 % cream, Apply 1 Application topically to the appropriate area as directed As Needed., Disp: , Rfl:     ketoconazole (NIZORAL) 2 % shampoo, Apply  topically to the appropriate area as directed 1 (One) Time Per Week., Disp: , Rfl:     losartan (COZAAR) 25 MG tablet, Take 1 tablet by mouth every night at bedtime., Disp: 90 tablet, Rfl: 3    metFORMIN (GLUCOPHAGE) 1000 MG tablet, Take 1 tablet by mouth 2 (Two) Times a Day With Meals., Disp: 180 tablet, Rfl: 1    metoprolol tartrate (LOPRESSOR) 25 MG tablet, Take 0.5 tablets by mouth 2 (Two) Times a Day., Disp: 90 tablet, Rfl: 3    rosuvastatin (CRESTOR) 40 MG tablet, Take 1 tablet by mouth Daily., Disp: 90 tablet, Rfl: 3    sildenafil (VIAGRA) 100 MG tablet, TAKE 1 TABLET BY MOUTH DAILY AS NEEDED FOR ERECTILE DYSFUNCTION, Disp: 10 tablet, Rfl: 1           ROS  ROS      SOCIAL HX  Social History     Socioeconomic History    Marital status:    Tobacco Use    Smoking status: Former     Current packs/day: 0.00     Average packs/day: 2.0 packs/day for 15.0 years (30.0 ttl pk-yrs)     Types: Cigarettes     Start date: 1965     Quit date: 1980     Years since quittin.7     Passive exposure: Past    Smokeless tobacco: Never   Vaping Use    Vaping status: Never Used   Substance and Sexual Activity    Alcohol use: Not Currently    Drug use: Never    Sexual activity: Not Currently     Partners: Female       FAMILY HX  Family History   Problem Relation Age of Onset    Lung cancer Mother     Arthritis Sister              Brennen Lazo III, MD, FACC       none

## 2024-12-11 ENCOUNTER — APPOINTMENT (OUTPATIENT)
Dept: ENDOCRINOLOGY | Facility: CLINIC | Age: 74
End: 2024-12-11

## 2024-12-11 ENCOUNTER — NON-APPOINTMENT (OUTPATIENT)
Age: 74
End: 2024-12-11

## 2024-12-11 ENCOUNTER — APPOINTMENT (OUTPATIENT)
Dept: ENDOCRINOLOGY | Facility: CLINIC | Age: 74
End: 2024-12-11
Payer: MEDICARE

## 2024-12-11 VITALS
BODY MASS INDEX: 27.85 KG/M2 | HEIGHT: 69 IN | HEART RATE: 60 BPM | DIASTOLIC BLOOD PRESSURE: 80 MMHG | WEIGHT: 188 LBS | OXYGEN SATURATION: 97 % | SYSTOLIC BLOOD PRESSURE: 118 MMHG

## 2024-12-11 DIAGNOSIS — E78.5 HYPERLIPIDEMIA, UNSPECIFIED: ICD-10-CM

## 2024-12-11 DIAGNOSIS — E11.9 TYPE 2 DIABETES MELLITUS W/OUT COMPLICATIONS: ICD-10-CM

## 2024-12-11 LAB — GLUCOSE BLDC GLUCOMTR-MCNC: 179

## 2024-12-11 PROCEDURE — 99214 OFFICE O/P EST MOD 30 MIN: CPT

## 2024-12-11 PROCEDURE — G2211 COMPLEX E/M VISIT ADD ON: CPT

## 2024-12-11 PROCEDURE — 82962 GLUCOSE BLOOD TEST: CPT

## 2025-02-12 NOTE — ASSESSMENT
2025    Ritu Quiles   1995        To Whom it May Concern;    Please excuse Ritu Quiles from work due to illness.  She may return no earlier than 2025.  She must be fever-free and starting to feel better for at least 24 hours before returning, and she must have completed 24 hours on antibiotics before returning.    Sincerely,        Deanna Stanley MD   [FreeTextEntry1] : 73 y.o. Male with PMHx of DM, HLD, BPH and Arrhythmia, follows up for DM management. 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 Upon previous visit he stopped Metformin due to constipation and feeling tired but then resume it again.   Currently on Metformin 500 ER mg BID (regular MF gives him GI symptoms)  Has Libre2 but don't use it POC today 136   # Moderately controlled T2D (Dx 4/2022) Autoimmune diabetes was ruled out. A1C improved 7.5%<==8%<==8%<==6.9% However, reportedly BG and Wt went up over the holidays. He admits eating cookies and  Now patient is back to his routines and physical activities. Cycling indoor and runs on treadmill.  Seeing dietitian at his PCP office Continue current regimen. Discussed dietary and physical activates which patient is well aware of.   # Arrhythmia Controlled with BB F/u with cardiology  # HLD Continue statin  # BPH Continue Finasteride and Tamsulosin Advised to keep good hydration  Follow up in 3 months with repeat BW.

## 2025-07-23 ENCOUNTER — NON-APPOINTMENT (OUTPATIENT)
Age: 75
End: 2025-07-23

## 2025-07-24 ENCOUNTER — APPOINTMENT (OUTPATIENT)
Dept: ENDOCRINOLOGY | Facility: CLINIC | Age: 75
End: 2025-07-24
Payer: MEDICARE

## 2025-07-24 VITALS
OXYGEN SATURATION: 95 % | BODY MASS INDEX: 27.25 KG/M2 | SYSTOLIC BLOOD PRESSURE: 110 MMHG | WEIGHT: 184 LBS | DIASTOLIC BLOOD PRESSURE: 76 MMHG | HEIGHT: 69 IN | HEART RATE: 80 BPM

## 2025-07-24 DIAGNOSIS — E11.9 TYPE 2 DIABETES MELLITUS W/OUT COMPLICATIONS: ICD-10-CM

## 2025-07-24 DIAGNOSIS — E78.5 HYPERLIPIDEMIA, UNSPECIFIED: ICD-10-CM

## 2025-07-24 LAB — GLUCOSE BLDC GLUCOMTR-MCNC: 281

## 2025-07-24 PROCEDURE — 99214 OFFICE O/P EST MOD 30 MIN: CPT

## 2025-07-24 PROCEDURE — G2211 COMPLEX E/M VISIT ADD ON: CPT

## 2025-07-24 PROCEDURE — 82962 GLUCOSE BLOOD TEST: CPT
